# Patient Record
Sex: MALE | Race: WHITE | Employment: OTHER | URBAN - METROPOLITAN AREA
[De-identification: names, ages, dates, MRNs, and addresses within clinical notes are randomized per-mention and may not be internally consistent; named-entity substitution may affect disease eponyms.]

---

## 2017-01-19 ENCOUNTER — ED HISTORICAL/CONVERTED ENCOUNTER (OUTPATIENT)
Dept: OTHER | Age: 42
End: 2017-01-19

## 2017-05-04 ENCOUNTER — OP HISTORICAL/CONVERTED ENCOUNTER (OUTPATIENT)
Dept: OTHER | Age: 42
End: 2017-05-04

## 2017-05-22 ENCOUNTER — ED HISTORICAL/CONVERTED ENCOUNTER (OUTPATIENT)
Dept: OTHER | Age: 42
End: 2017-05-22

## 2017-07-20 ENCOUNTER — ED HISTORICAL/CONVERTED ENCOUNTER (OUTPATIENT)
Dept: OTHER | Age: 42
End: 2017-07-20

## 2020-08-04 ENCOUNTER — OFFICE VISIT (OUTPATIENT)
Dept: ORTHOPEDIC SURGERY | Age: 45
End: 2020-08-04
Payer: MEDICARE

## 2020-08-04 VITALS — HEIGHT: 60 IN | WEIGHT: 230 LBS | BODY MASS INDEX: 45.16 KG/M2

## 2020-08-04 DIAGNOSIS — M25.441 SWELLING OF JOINT OF RIGHT HAND: Primary | ICD-10-CM

## 2020-08-04 PROCEDURE — 99203 OFFICE O/P NEW LOW 30 MIN: CPT | Performed by: ORTHOPAEDIC SURGERY

## 2020-08-04 PROCEDURE — G8427 DOCREV CUR MEDS BY ELIG CLIN: HCPCS | Performed by: ORTHOPAEDIC SURGERY

## 2020-08-04 NOTE — PROGRESS NOTES
Name: Elvia Rivera    : 1975  Service Dept: [address] [place] [address] AND SPORTS MEDICINE         Patient presents with:  Hand Pain: right       Patient's Pharmacies:    [facility], [facility] [address]  [address] [address]  Phone: [phone] Fax: [phone] [address], [address]  [address] [place] [address]  Phone: [phone] Fax: [phone]        3' 11\" (1.194 m)   Wt 230 lb (104.3 kg)   BMI 73.20 kg/m²       -- Penicillins -- Other (comments)    --  Throat closes     Current Outpatient Medications:  atorvastatin (LIPITOR) 40 mg tablet, Take 1 Tab by mouth daily. For lipids. Please make an appointment foradditional refills. , Disp: 30 Tab, Rfl: 0  QUEtiapine SR (SEROQUEL XR) 300 mg sr tablet, Take 300 mg by mouth nightly., Disp: , Rfl:   traZODone (DESYREL) 100 mg tablet, Take 200 mg by mouth nightly., Disp: , Rfl:   haloperidol (HALDOL) 5 mg tablet, Take 5 mg by mouth two (2) times a day., Disp: , Rfl:   clonazePAM (KLONOPIN) 1 mg tablet, Take  by mouth two (2) times a day., Disp: , Rfl:   divalproex DR (DEPAKOTE) 500 mg tablet, Take 500 mg by mouth two (2) times a day., Disp: , Rfl:   divalproex DR (DEPAKOTE) 250 mg tablet, Take 250 mg by mouth daily. , Disp: , Rfl:   gabapentin (NEURONTIN) 800 mg tablet, Take  by mouth three (3) times daily. , Disp: , Rfl:   traMADol (ULTRAM) 50 mg tablet, Take 50 mg by mouth every eight (8) hours as needed for Pain., Disp: , Rfl:   oxyCODONE-acetaminophen (PERCOCET 7.5) 7.5-325 mg per tablet, Take 1 Tab by mouth two (2) times daily as needed for Pain., Disp: , Rfl:   Dextromethorphan-guaiFENesin (MUCINEX DM) 60-1,200 mg TM12, Take 1 Tab by mouth two (2) times daily (after meals). , Disp: 20 Tab, Rfl: 0         Patient Active Problem List:     Obese [E66.9]     Well adult Nadean Fraction     Smoker unmotivated to quit [F17.200]     Chronic back pain greater than 3 months duration [M54.9, G89.29]     Previous back surgery [Z98.890]     Hyperlipidemia, mixed [E78.2]       Review of patient's family history indicates:  Problem: Diabetes      Relation: Mother          Age of Onset: (Not Specified)  Problem: Hypertension      Relation: Mother          Age of Onset: (Not Specified)  Problem: Heart Disease      Relation: Mother          Age of Onset: (Not Specified)  Problem: Cancer      Relation: Maternal Aunt          Age of Onset: (Not Specified)  Problem: Diabetes      Relation: Maternal Aunt          Age of Onset: (Not Specified)  Problem: Heart Disease      Relation: Maternal Aunt          Age of Onset: (Not Specified)  Problem: Hypertension      Relation: Maternal Aunt          Age of Onset: (Not Specified)       Social History    Socioeconomic History      Marital status: LEGALLY       Spouse name: Not on file      Number of children: Not on file      Years of education: Not on file      Highest education level: Not on file    Tobacco Use      Smoking status: Current Some Day Smoker        Packs/day: 0.00      Smokeless tobacco: Never Used    Substance and Sexual Activity      Alcohol use: Yes        Alcohol/week: 1.0 standard drinks        Types: 1 Cans of beer per week      Drug use: No      Sexual activity: Yes        Partners: Female        Birth control/protection: Condom       Past Surgical History:  No date: HX BACK SURGERY      Comment:  3 failed     Past Medical History:  No date:  Anxiety  No date: Arthritis  No date: Blurred vision  No date: Constipation  No date: Coughing  No date: Diarrhea  No date: High cholesterol  No date: History of back surgery      Comment:  3 failed surgeries  No date: Joint pain      Comment:  all over  No date: Joint swelling  No date: Multiple stiff joints  No date: Muscle ache  No date: Muscle pain  No date: Muscle weakness  No date: Sinus complaint  No date: Sleeping difficulty  No date: SOB (shortness of breath)  No date: Stress     HPI:   I have reviewed and agree with PFSH and ROS and intake form in chart and the record. Review of Systems:   Patient is a pleasant appearing individual, appropriately dressed, well hydrated, well nourished, who is alert, appropriately oriented for age, and in no acute distress with a normal gait and normal affect who does not appear to be in any significant pain. Physical Exam:  Right 3rd Finger is grossly neurovascularly intact with good cap refill, slight decreased range of motion with slightly decreased strength, able to flex and extend against resistance, mild swelling, no instability and no other skin lesions noted. HPI:  The patient is here with a chief complaint of right hand and finger pain, throbbing, burning pain. It is the same. Nothing has helped. Using it makes it worse. Pain is 7/10. ROS:  Positive for nighttime pain, locking, and night sweats. X-rays of the right hand are unremarkable. Assessment/Plan:  1. Right middle finger sprain. Plan at this point, Voltaren Gel, physical therapy, that needs to be done at [facility].  We will see him back in eight weeks. If he is not better, we will consider an MRI of his right hand and go from there but continue with conservative treatment in the meantime. Scribed by Spring Minna as dictated by [facility]NA San MD.    This function is not supported for plain text fields. Return to Office: Follow-up Information    None           Documentation True and Accepted Jordan San MD

## 2020-08-04 NOTE — PROGRESS NOTES
Providers protocol for the intake nurse to complete in patient's chart:    J Luis Siegel presents today for   Chief Complaint   Patient presents with    Hand Pain     right       Pain assessment:entered from intake paperwork  Height entered from intake paperwork    Weight entered from intake paperwork      Tempeture is taken by AYSHA CASTRO  Travel Screening done by AYSHA CASTRO    Provider will complete the rest of patient's chart

## 2020-08-05 RX ORDER — DICLOFENAC SODIUM 10 MG/G
2 GEL TOPICAL 4 TIMES DAILY
Qty: 100 G | Refills: 5 | Status: SHIPPED | OUTPATIENT
Start: 2020-08-05 | End: 2020-08-05

## 2020-08-05 RX ORDER — DICLOFENAC SODIUM 10 MG/G
2 GEL TOPICAL 4 TIMES DAILY
Qty: 100 G | Refills: 5 | Status: SHIPPED | OUTPATIENT
Start: 2020-08-05

## 2020-08-05 NOTE — PATIENT INSTRUCTIONS

## 2021-08-14 ENCOUNTER — APPOINTMENT (OUTPATIENT)
Dept: GENERAL RADIOLOGY | Age: 46
End: 2021-08-14
Attending: NURSE PRACTITIONER
Payer: MEDICARE

## 2021-08-14 ENCOUNTER — HOSPITAL ENCOUNTER (EMERGENCY)
Age: 46
Discharge: HOME | End: 2021-08-15
Payer: MEDICARE

## 2021-08-14 VITALS
RESPIRATION RATE: 20 BRPM | WEIGHT: 225 LBS | BODY MASS INDEX: 32.21 KG/M2 | HEIGHT: 70 IN | SYSTOLIC BLOOD PRESSURE: 114 MMHG | TEMPERATURE: 97.9 F | DIASTOLIC BLOOD PRESSURE: 75 MMHG | OXYGEN SATURATION: 95 % | HEART RATE: 108 BPM

## 2021-08-14 DIAGNOSIS — M51.36 DDD (DEGENERATIVE DISC DISEASE), LUMBAR: Primary | ICD-10-CM

## 2021-08-14 LAB
AMPHET UR QL SCN: NEGATIVE
APPEARANCE UR: CLEAR
BACTERIA URNS QL MICRO: NEGATIVE /HPF
BARBITURATES UR QL SCN: NEGATIVE
BENZODIAZ UR QL: NEGATIVE
BILIRUB UR QL: NEGATIVE
CANNABINOIDS UR QL SCN: NEGATIVE
COCAINE UR QL SCN: NEGATIVE
COLOR UR: ABNORMAL
DRUG SCRN COMMENT,DRGCM: NORMAL
GLUCOSE UR STRIP.AUTO-MCNC: NEGATIVE MG/DL
HGB UR QL STRIP: NEGATIVE
KETONES UR QL STRIP.AUTO: NEGATIVE MG/DL
LEUKOCYTE ESTERASE UR QL STRIP.AUTO: NEGATIVE
METHADONE UR QL: NEGATIVE
NITRITE UR QL STRIP.AUTO: NEGATIVE
OPIATES UR QL: NEGATIVE
PCP UR QL: NEGATIVE
PH UR STRIP: 5 [PH] (ref 5–8)
PROT UR STRIP-MCNC: NEGATIVE MG/DL
RBC #/AREA URNS HPF: ABNORMAL /HPF (ref 0–5)
SP GR UR REFRACTOMETRY: 1 (ref 1–1.03)
UA: UC IF INDICATED,UAUC: ABNORMAL
UROBILINOGEN UR QL STRIP.AUTO: 0.1 EU/DL (ref 0.1–1)
WBC URNS QL MICRO: ABNORMAL /HPF (ref 0–4)

## 2021-08-14 PROCEDURE — 80307 DRUG TEST PRSMV CHEM ANLYZR: CPT

## 2021-08-14 PROCEDURE — 96372 THER/PROPH/DIAG INJ SC/IM: CPT

## 2021-08-14 PROCEDURE — 74011250636 HC RX REV CODE- 250/636: Performed by: NURSE PRACTITIONER

## 2021-08-14 PROCEDURE — 99282 EMERGENCY DEPT VISIT SF MDM: CPT

## 2021-08-14 PROCEDURE — 81001 URINALYSIS AUTO W/SCOPE: CPT

## 2021-08-14 PROCEDURE — 72110 X-RAY EXAM L-2 SPINE 4/>VWS: CPT

## 2021-08-14 PROCEDURE — 87086 URINE CULTURE/COLONY COUNT: CPT

## 2021-08-14 RX ORDER — IBUPROFEN 600 MG/1
600 TABLET ORAL
Qty: 20 TABLET | Refills: 0 | Status: SHIPPED | OUTPATIENT
Start: 2021-08-14

## 2021-08-14 RX ORDER — KETOROLAC TROMETHAMINE 30 MG/ML
60 INJECTION, SOLUTION INTRAMUSCULAR; INTRAVENOUS
Status: COMPLETED | OUTPATIENT
Start: 2021-08-14 | End: 2021-08-14

## 2021-08-14 RX ADMIN — KETOROLAC TROMETHAMINE 60 MG: 30 INJECTION, SOLUTION INTRAMUSCULAR; INTRAVENOUS at 21:44

## 2021-08-15 NOTE — ED PROVIDER NOTES
EMERGENCY DEPARTMENT HISTORY AND PHYSICAL EXAM      Date: 8/14/2021  Patient Name: Fallon Hobson    History of Presenting Illness     Chief Complaint   Patient presents with   Chin Tristen Fall    Back Pain       History Provided By: Patient    HPI: Fallon Hobson, 50 y.o. male with a past medical history as noted below presents ambulatory to the ED with cc of back pain. Patient reports chronic back pain with multiple surgeries, followed by Dr. Teo Santos and pain management. Patient states he fell off 5 foot high deck 2 days ago. Since that time he reports worsening pain and states he has been unable to walk. He denies any saddle numbness or changes in bowel/bladder habits. He describes his pain as severe sharp in nature, 10/10 presently. He states he has been using heating pad and Tylenol with minimal improvement, aggravating factors include movement, alleviating factors none. There are no other complaints, changes, or physical findings at this time. PCP: Dago Sims MD    No current facility-administered medications on file prior to encounter. Current Outpatient Medications on File Prior to Encounter   Medication Sig Dispense Refill    diclofenac (VOLTAREN) 1 % gel Apply 2 g to affected area four (4) times daily. 100 g 5    atorvastatin (LIPITOR) 40 mg tablet Take 1 Tab by mouth daily. For lipids. Please make an appointment foradditional refills. 30 Tab 0    QUEtiapine SR (SEROQUEL XR) 300 mg sr tablet Take 300 mg by mouth nightly.  traZODone (DESYREL) 100 mg tablet Take 200 mg by mouth nightly.  haloperidol (HALDOL) 5 mg tablet Take 5 mg by mouth two (2) times a day.  clonazePAM (KLONOPIN) 1 mg tablet Take  by mouth two (2) times a day.  divalproex DR (DEPAKOTE) 500 mg tablet Take 500 mg by mouth two (2) times a day.  divalproex DR (DEPAKOTE) 250 mg tablet Take 250 mg by mouth daily.  gabapentin (NEURONTIN) 800 mg tablet Take  by mouth three (3) times daily.       traMADol (ULTRAM) 50 mg tablet Take 50 mg by mouth every eight (8) hours as needed for Pain.  oxyCODONE-acetaminophen (PERCOCET 7.5) 7.5-325 mg per tablet Take 1 Tab by mouth two (2) times daily as needed for Pain.  Dextromethorphan-guaiFENesin (MUCINEX DM) 60-1,200 mg TM12 Take 1 Tab by mouth two (2) times daily (after meals). 20 Tab 0       Past History     Past Medical History:  Past Medical History:   Diagnosis Date    Anxiety     Anxiety     Arthritis     Bipolar 2 disorder (HCC)     Blurred vision     Constipation     Coughing     Diarrhea     High cholesterol     History of back surgery     3 failed surgeries    Joint pain     all over    Joint swelling     Manic depression (HCC)     Multiple stiff joints     Muscle ache     Muscle pain     Muscle weakness     Schizophrenia (HCC)     Sinus complaint     Sleeping difficulty     SOB (shortness of breath)     Stress        Past Surgical History:  Past Surgical History:   Procedure Laterality Date    HX BACK SURGERY      3 failed       Family History:  Family History   Problem Relation Age of Onset    Diabetes Mother     Hypertension Mother     Heart Disease Mother     Cancer Maternal Aunt     Diabetes Maternal Aunt     Heart Disease Maternal Aunt     Hypertension Maternal Aunt        Social History:  Social History     Tobacco Use    Smoking status: Current Every Day Smoker     Packs/day: 0.00    Smokeless tobacco: Never Used   Substance Use Topics    Alcohol use: Not Currently     Alcohol/week: 1.0 standard drinks     Types: 1 Cans of beer per week    Drug use: No       Allergies: Allergies   Allergen Reactions    Penicillins Other (comments)     Throat closes         Review of Systems     Review of Systems   Genitourinary: Negative. Musculoskeletal: Positive for back pain and gait problem. Neurological: Negative for numbness. All other systems reviewed and are negative.       Physical Exam     Physical Exam  Vitals and nursing note reviewed. Constitutional:       General: He is not in acute distress. Appearance: Normal appearance. Eyes:      Extraocular Movements: Extraocular movements intact. Conjunctiva/sclera: Conjunctivae normal.   Cardiovascular:      Rate and Rhythm: Normal rate and regular rhythm. Heart sounds: Normal heart sounds. Pulmonary:      Effort: Pulmonary effort is normal.      Breath sounds: Normal breath sounds. No wheezing or rales. Musculoskeletal:      Lumbar back: Signs of trauma and tenderness present. No deformity. Decreased range of motion. Comments: No midline tenderness, no step off. +right paraspinal tenderness. +SI joint pain with producible pain. Sensation intact distally. Strength 5/5 in BL lower extremities     Skin:     General: Skin is warm and dry. Neurological:      General: No focal deficit present. Mental Status: He is alert.       Comments: Ambulating with cane, minimal difficulty         Lab and Diagnostic Study Results     Labs -     Recent Results (from the past 12 hour(s))   URINALYSIS W/ REFLEX CULTURE    Collection Time: 08/14/21  8:10 PM    Specimen: Urine   Result Value Ref Range    Color Yellow/Straw      Appearance Clear Clear      Specific gravity 1.005 1.003 - 1.030      pH (UA) 5.0 5.0 - 8.0      Protein Negative Negative mg/dL    Glucose Negative Negative mg/dL    Ketone Negative Negative mg/dL    Bilirubin Negative Negative      Blood Negative Negative      Urobilinogen 0.1 0.1 - 1.0 EU/dL    Nitrites Negative Negative      Leukocyte Esterase Negative Negative      UA:UC IF INDICATED Urine Culture Ordered (A) Culture not indicated by UA result      WBC 0-5 0 - 4 /hpf    RBC 0-5 0 - 5 /hpf    Bacteria Negative Negative /hpf   DRUG SCREEN, URINE    Collection Time: 08/14/21  8:10 PM   Result Value Ref Range    AMPHETAMINES Negative Negative      BARBITURATES Negative Negative      BENZODIAZEPINES Negative Negative      COCAINE Negative Negative      METHADONE Negative Negative      OPIATES Negative Negative      PCP(PHENCYCLIDINE) Negative Negative      THC (TH-CANNABINOL) Negative Negative      Drug screen comment        This test is a screen for drugs of abuse in a medical setting only (i.e., they are unconfirmed results and as such must not be used for non-medical purposes, e.g.,employment testing, legal testing). Due to its inherent nature, false positive (FP) and false negative (FN) results may be obtained. Therefore, if necessary for medical care, recommend confirmation of positive findings by GC/MS. Radiologic Studies -   XR Results (most recent):  Results from Hospital Encounter encounter on 08/14/21    XR SPINE LUMB MIN 4 V    Narrative  Lumbar spine, 5 views    Impression  Degenerative disc disease with disc space narrowing and osteophyte  at L4-L5. Decompressive laminectomy at L4 and L5. Normal spinal alignment. No  evidence of acute fracture or focal lytic or blastic bone lesion. Degenerative  disc disease with disc space narrowing and small anterior osteophyte, T11-T12. Medical Decision Making   - I am the first provider for this patient. - I reviewed the vital signs, available nursing notes, past medical history, past surgical history, family history and social history. - Initial assessment performed. The patients presenting problems have been discussed, and they are in agreement with the care plan formulated and outlined with them. I have encouraged them to ask questions as they arise throughout their visit. Vital Signs-Reviewed the patient's vital signs.   Patient Vitals for the past 12 hrs:   Temp Pulse Resp BP SpO2   08/14/21 1942 97.9 °F (36.6 °C) (!) 108 20 114/75 95 %       Records Reviewed: Nursing Notes and Old Medical Records    The patient presents with back pain with a differential diagnosis of  kidney stone, lumbar strain, pneumonia and traumatic injury      ED Course:   Patient presents with worsening back pain after a fall 2 days ago. Neurovascularly intact. He is ambulatory with minimal difficulty upon arrival.  X-ray showing diffuse DDD, no acute findings. He was treated with IM Toradol. Patient advised to follow-up with orthopedic and pain management as previous, states he has a pending Ortho eval in 2 days. Discussed worrisome reasons to return to the department including worsening symptoms, decrease in sensation or changes in bowel or bladder habits. Provider Notes (Medical Decision Making):     MDM  Number of Diagnoses or Management Options  DDD (degenerative disc disease), lumbar: established, worsening     Amount and/or Complexity of Data Reviewed  Tests in the radiology section of CPT®: ordered and reviewed             Disposition   Disposition: Condition stable  DC-The patient was given verbal follow-up instructions  DC- Pain Control DC Home plan: Nonsteroidals, Tylenol and Referral Orthopedics    Discharged    DISCHARGE PLAN:  1. Current Discharge Medication List      START taking these medications    Details   ibuprofen (MOTRIN) 600 mg tablet Take 1 Tablet by mouth every six (6) hours as needed for Pain. Qty: 20 Tablet, Refills: 0         CONTINUE these medications which have NOT CHANGED    Details   diclofenac (VOLTAREN) 1 % gel Apply 2 g to affected area four (4) times daily. Qty: 100 g, Refills: 5      atorvastatin (LIPITOR) 40 mg tablet Take 1 Tab by mouth daily. For lipids. Please make an appointment foradditional refills. Qty: 30 Tab, Refills: 0    Associated Diagnoses: Hyperlipidemia, mixed      QUEtiapine SR (SEROQUEL XR) 300 mg sr tablet Take 300 mg by mouth nightly. traZODone (DESYREL) 100 mg tablet Take 200 mg by mouth nightly.      haloperidol (HALDOL) 5 mg tablet Take 5 mg by mouth two (2) times a day. clonazePAM (KLONOPIN) 1 mg tablet Take  by mouth two (2) times a day.       !! divalproex DR (DEPAKOTE) 500 mg tablet Take 500 mg by mouth two (2) times a day. !! divalproex DR (DEPAKOTE) 250 mg tablet Take 250 mg by mouth daily. gabapentin (NEURONTIN) 800 mg tablet Take  by mouth three (3) times daily. traMADol (ULTRAM) 50 mg tablet Take 50 mg by mouth every eight (8) hours as needed for Pain. oxyCODONE-acetaminophen (PERCOCET 7.5) 7.5-325 mg per tablet Take 1 Tab by mouth two (2) times daily as needed for Pain. Dextromethorphan-guaiFENesin (MUCINEX DM) 60-1,200 mg TM12 Take 1 Tab by mouth two (2) times daily (after meals). Qty: 20 Tab, Refills: 0    Associated Diagnoses: Viral upper respiratory tract infection with cough       !! - Potential duplicate medications found. Please discuss with provider. 2.   Follow-up Information     Follow up With Specialties Details Why Contact Info    Thornton Baumgarten, MD Orthopedic Surgery Go to  as scheduled [address] [facility]  [address]      Stephanie Higgins MD Family Medicine Schedule an appointment as soon as possible for a visit  for ER follow up [address] [place] [address]  [phone]          3. Return to ED if worse   4. Current Discharge Medication List      START taking these medications    Details   ibuprofen (MOTRIN) 600 mg tablet Take 1 Tablet by mouth every six (6) hours as needed for Pain. Qty: 20 Tablet, Refills: 0  Start date: 8/14/2021               Diagnosis     Clinical Impression:   1. DDD (degenerative disc disease), lumbar        Attestations:    Nima Ray NP    Please note that this dictation was completed with [employer], the [employer] voice recognition software. Quite often unanticipated grammatical, syntax, homophones, and other interpretive errors are inadvertently transcribed by the computer software. Please disregard these errors. Please excuse any errors that have escaped final proofreading. Thank you.

## 2021-08-15 NOTE — DISCHARGE INSTRUCTIONS
Thank you! Thank you for allowing me to care for you in the emergency department. I sincerely hope that you are satisfied with your visit today. It is my goal to provide you with excellent care. Below you will find a list of your labs and imaging from your visit today. Should you have any questions regarding these results please do not hesitate to call the emergency department. Labs -     Recent Results (from the past 12 hour(s))   URINALYSIS W/ REFLEX CULTURE    Collection Time: 08/14/21  8:10 PM    Specimen: Urine   Result Value Ref Range    Color Yellow/Straw      Appearance Clear Clear      Specific gravity 1.005 1.003 - 1.030      pH (UA) 5.0 5.0 - 8.0      Protein Negative Negative mg/dL    Glucose Negative Negative mg/dL    Ketone Negative Negative mg/dL    Bilirubin Negative Negative      Blood Negative Negative      Urobilinogen 0.1 0.1 - 1.0 EU/dL    Nitrites Negative Negative      Leukocyte Esterase Negative Negative      UA:UC IF INDICATED Urine Culture Ordered (A) Culture not indicated by UA result      WBC 0-5 0 - 4 /hpf    RBC 0-5 0 - 5 /hpf    Bacteria Negative Negative /hpf   DRUG SCREEN, URINE    Collection Time: 08/14/21  8:10 PM   Result Value Ref Range    AMPHETAMINES Negative Negative      BARBITURATES Negative Negative      BENZODIAZEPINES Negative Negative      COCAINE Negative Negative      METHADONE Negative Negative      OPIATES Negative Negative      PCP(PHENCYCLIDINE) Negative Negative      THC (TH-CANNABINOL) Negative Negative      Drug screen comment        This test is a screen for drugs of abuse in a medical setting only (i.e., they are unconfirmed results and as such must not be used for non-medical purposes, e.g.,employment testing, legal testing). Due to its inherent nature, false positive (FP) and false negative (FN) results may be obtained. Therefore, if necessary for medical care, recommend confirmation of positive findings by GC/MS.        Radiologic Studies -   XR SPINE LUMB MIN 4 V   Final Result   Degenerative disc disease with disc space narrowing and osteophyte   at L4-L5. Decompressive laminectomy at L4 and L5. Normal spinal alignment. No   evidence of acute fracture or focal lytic or blastic bone lesion. Degenerative   disc disease with disc space narrowing and small anterior osteophyte, T11-T12. CT Results  (Last 48 hours)      None          CXR Results  (Last 48 hours)      None               If you feel that you have not received excellent quality care or timely care, please ask to speak to the nurse manager. Please choose us in the future for your continued health care needs. ------------------------------------------------------------------------------------------------------------  The exam and treatment you received in the Emergency Department were for an urgent problem and are not intended as complete care. It is important that you follow-up with a doctor, nurse practitioner, or physician assistant to:  (1) confirm your diagnosis,  (2) re-evaluation of changes in your illness and treatment, and  (3) for ongoing care. If your symptoms become worse or you do not improve as expected and you are unable to reach your usual health care provider, you should return to the Emergency Department. We are available 24 hours a day. Please take your discharge instructions with you when you go to your follow-up appointment. If you have any problem arranging a follow-up appointment, contact the Emergency Department immediately. If a prescription has been provided, please have it filled as soon as possible to prevent a delay in treatment. Read the entire medication instruction sheet provided to you by the pharmacy. If you have any questions or reservations about taking the medication due to side effects or interactions with other medications, please call your primary care physician or contact the ER to speak with the charge nurse.      Make an appointment with your family doctor or the physician you were referred to for follow-up of this visit as instructed on your discharge paperwork, as this is a mandatory follow-up. Return to the ER if you are unable to be seen or if you are unable to be seen in a timely manner. If you have any problem arranging the follow-up visit, contact the Emergency Department immediately.

## 2021-08-16 LAB
BACTERIA SPEC CULT: NORMAL
SPECIAL REQUESTS,SREQ: NORMAL

## 2021-08-17 ENCOUNTER — HOSPITAL ENCOUNTER (EMERGENCY)
Age: 46
Discharge: HOME | End: 2021-08-17
Payer: MEDICARE

## 2021-08-17 ENCOUNTER — APPOINTMENT (OUTPATIENT)
Dept: GENERAL RADIOLOGY | Age: 46
End: 2021-08-17
Attending: EMERGENCY MEDICINE
Payer: MEDICARE

## 2021-08-17 VITALS
RESPIRATION RATE: 18 BRPM | WEIGHT: 230 LBS | BODY MASS INDEX: 32.93 KG/M2 | DIASTOLIC BLOOD PRESSURE: 63 MMHG | TEMPERATURE: 98 F | SYSTOLIC BLOOD PRESSURE: 108 MMHG | HEART RATE: 111 BPM | OXYGEN SATURATION: 97 % | HEIGHT: 70 IN

## 2021-08-17 DIAGNOSIS — M19.072 OSTEOARTHRITIS OF LEFT ANKLE AND FOOT: ICD-10-CM

## 2021-08-17 DIAGNOSIS — M25.572 ACUTE LEFT ANKLE PAIN: ICD-10-CM

## 2021-08-17 DIAGNOSIS — M77.50 ANKLE BONE SPUR: ICD-10-CM

## 2021-08-17 DIAGNOSIS — M79.672 FOOT PAIN, LEFT: Primary | ICD-10-CM

## 2021-08-17 PROCEDURE — 73610 X-RAY EXAM OF ANKLE: CPT

## 2021-08-17 PROCEDURE — 73630 X-RAY EXAM OF FOOT: CPT

## 2021-08-17 PROCEDURE — 99283 EMERGENCY DEPT VISIT LOW MDM: CPT

## 2021-08-17 RX ORDER — HYDROCODONE BITARTRATE AND ACETAMINOPHEN 5; 325 MG/1; MG/1
1 TABLET ORAL
Qty: 10 TABLET | Refills: 0 | Status: SHIPPED | OUTPATIENT
Start: 2021-08-17 | End: 2021-08-20

## 2021-08-17 RX ORDER — DICLOFENAC SODIUM 75 MG/1
75 TABLET, DELAYED RELEASE ORAL 2 TIMES DAILY
Qty: 20 TABLET | Refills: 0 | Status: SHIPPED | OUTPATIENT
Start: 2021-08-17 | End: 2021-08-27

## 2021-08-17 NOTE — ED PROVIDER NOTES
EMERGENCY DEPARTMENT HISTORY AND PHYSICAL EXAM      Date: 8/17/2021  Patient Name: Linda Stock    History of Presenting Illness     Chief Complaint   Patient presents with    Foot Pain       History Provided By: Patient    HPI: Linda Stock, 50 y.o. male with a past medical history significant schizophrenia, manic depresion, anxiety, high cholesterol. presents to the ED with cc of glf today , twisted left ankle and left foot. Pain and swelling. Onset several hours prior to arrival     There are no other complaints, changes, or physical findings at this time. PCP: Sue Figueroa MD    No current facility-administered medications on file prior to encounter. Current Outpatient Medications on File Prior to Encounter   Medication Sig Dispense Refill    ibuprofen (MOTRIN) 600 mg tablet Take 1 Tablet by mouth every six (6) hours as needed for Pain. 20 Tablet 0    diclofenac (VOLTAREN) 1 % gel Apply 2 g to affected area four (4) times daily. 100 g 5    atorvastatin (LIPITOR) 40 mg tablet Take 1 Tab by mouth daily. For lipids. Please make an appointment foradditional refills. 30 Tab 0    QUEtiapine SR (SEROQUEL XR) 300 mg sr tablet Take 300 mg by mouth nightly.  traZODone (DESYREL) 100 mg tablet Take 200 mg by mouth nightly.  haloperidol (HALDOL) 5 mg tablet Take 5 mg by mouth two (2) times a day.  clonazePAM (KLONOPIN) 1 mg tablet Take  by mouth two (2) times a day.  divalproex DR (DEPAKOTE) 500 mg tablet Take 500 mg by mouth two (2) times a day.  divalproex DR (DEPAKOTE) 250 mg tablet Take 250 mg by mouth daily.  gabapentin (NEURONTIN) 800 mg tablet Take  by mouth three (3) times daily.  traMADol (ULTRAM) 50 mg tablet Take 50 mg by mouth every eight (8) hours as needed for Pain.  oxyCODONE-acetaminophen (PERCOCET 7.5) 7.5-325 mg per tablet Take 1 Tab by mouth two (2) times daily as needed for Pain.       Dextromethorphan-guaiFENesin (MUCINEX DM) 60-1,200 mg TM12 Take 1 Tab by mouth two (2) times daily (after meals). 20 Tab 0       Past History     Past Medical History:  Past Medical History:   Diagnosis Date    Anxiety     Anxiety     Arthritis     Bipolar 2 disorder (HCC)     Blurred vision     Constipation     Coughing     Diarrhea     High cholesterol     History of back surgery     3 failed surgeries    Joint pain     all over    Joint swelling     Manic depression (HCC)     Multiple stiff joints     Muscle ache     Muscle pain     Muscle weakness     Schizophrenia (HCC)     Sinus complaint     Sleeping difficulty     SOB (shortness of breath)     Stress        Past Surgical History:  Past Surgical History:   Procedure Laterality Date    HX BACK SURGERY      3 failed       Family History:  Family History   Problem Relation Age of Onset    Diabetes Mother     Hypertension Mother     Heart Disease Mother     Cancer Maternal Aunt     Diabetes Maternal Aunt     Heart Disease Maternal Aunt     Hypertension Maternal Aunt        Social History:  Social History     Tobacco Use    Smoking status: Current Every Day Smoker     Packs/day: 0.00    Smokeless tobacco: Never Used   Substance Use Topics    Alcohol use: Not Currently     Alcohol/week: 1.0 standard drinks     Types: 1 Cans of beer per week    Drug use: No       Allergies: Allergies   Allergen Reactions    Penicillins Other (comments)     Throat closes         Review of Systems     Review of Systems   Constitutional: Negative. HENT: Negative. Respiratory: Negative. Cardiovascular: Negative. Gastrointestinal: Negative. Genitourinary: Negative. Musculoskeletal: Positive for joint swelling (left ankle. left foot;). Neurological: Negative. All other systems reviewed and are negative. Physical Exam     Physical Exam  Vitals and nursing note reviewed. Constitutional:       Appearance: Normal appearance. He is normal weight.    HENT:      Head: Normocephalic and atraumatic. Eyes:      Extraocular Movements: Extraocular movements intact. Pupils: Pupils are equal, round, and reactive to light. Cardiovascular:      Rate and Rhythm: Normal rate and regular rhythm. Pulses: Normal pulses. Heart sounds: Normal heart sounds. Pulmonary:      Effort: Pulmonary effort is normal.      Breath sounds: Normal breath sounds. Abdominal:      General: Abdomen is flat. Musculoskeletal:         General: Swelling, tenderness and signs of injury present. No deformity. Normal range of motion. Left lower leg: Edema (left foot and left ankle. ) present. Skin:     General: Skin is warm and dry. Capillary Refill: Capillary refill takes less than 2 seconds. Neurological:      General: No focal deficit present. Mental Status: He is alert and oriented to person, place, and time. Psychiatric:         Mood and Affect: Mood normal.         Behavior: Behavior normal.         Lab and Diagnostic Study Results     Labs -   No results found for this or any previous visit (from the past 12 hour(s)). Radiologic Studies -   @lastxrresult@  CT Results  (Last 48 hours)    None        CXR Results  (Last 48 hours)    None            Medical Decision Making   - I am the first provider for this patient. - I reviewed the vital signs, available nursing notes, past medical history, past surgical history, family history and social history. - Initial assessment performed. The patients presenting problems have been discussed, and they are in agreement with the care plan formulated and outlined with them. I have encouraged them to ask questions as they arise throughout their visit. Vital Signs-Reviewed the patient's vital signs. No data found. Records Reviewed: Nursing Notes    The patient presents with ANKLE PAIN with a differential diagnosis of ANKLE SPRAIN, STRAIN, FRACTURE, DISLOCATION, CONTUSION      ED Course:      IMAGING. SPLINTING.        Provider Notes (Medical Decision Making):     MDM  Number of Diagnoses or Management Options  Acute left ankle pain: new, needed workup  Ankle bone spur: new, needed workup  Foot pain, left: new, needed workup  Osteoarthritis of left ankle and foot: new, needed workup     Amount and/or Complexity of Data Reviewed  Tests in the radiology section of CPT®: ordered and reviewed    Risk of Complications, Morbidity, and/or Mortality  Presenting problems: minimal  Diagnostic procedures: low  Management options: minimal  General comments: RICE  Left ankle splint and crutches     Patient Progress  Patient progress: improved         Procedures   Medical Decision Makingedical Decision Making  Performed by: Maria Guadalupe Pizarro NP  PROCEDURES:  Procedures       Disposition   Disposition: Condition improved  DC- Adult Discharges: All of the diagnostic tests were reviewed and questions answered. Diagnosis, care plan and treatment options were discussed. The patient understands the instructions and will follow up as directed. The patients results have been reviewed with them. They have been counseled regarding their diagnosis. The patient verbally convey understanding and agreement of the signs, symptoms, diagnosis, treatment and prognosis and additionally agrees to follow up as recommended with their PCP in 24 - 48 hours. They also agree with the care-plan and convey that all of their questions have been answered. I have also put together some discharge instructions for them that include: 1) educational information regarding their diagnosis, 2) how to care for their diagnosis at home, as well a 3) list of reasons why they would want to return to the ED prior to their follow-up appointment, should their condition change. DC-The patient was given verbal follow-up instructions  DC- Pain Control DC Home plan: Nonsteroidals and Tylenol    Discharged    DISCHARGE PLAN:  1.    Current Discharge Medication List      CONTINUE these medications which have NOT CHANGED    Details   ibuprofen (MOTRIN) 600 mg tablet Take 1 Tablet by mouth every six (6) hours as needed for Pain. Qty: 20 Tablet, Refills: 0      diclofenac (VOLTAREN) 1 % gel Apply 2 g to affected area four (4) times daily. Qty: 100 g, Refills: 5      atorvastatin (LIPITOR) 40 mg tablet Take 1 Tab by mouth daily. For lipids. Please make an appointment foradditional refills. Qty: 30 Tab, Refills: 0    Associated Diagnoses: Hyperlipidemia, mixed      QUEtiapine SR (SEROQUEL XR) 300 mg sr tablet Take 300 mg by mouth nightly. traZODone (DESYREL) 100 mg tablet Take 200 mg by mouth nightly.      haloperidol (HALDOL) 5 mg tablet Take 5 mg by mouth two (2) times a day. clonazePAM (KLONOPIN) 1 mg tablet Take  by mouth two (2) times a day. !! divalproex DR (DEPAKOTE) 500 mg tablet Take 500 mg by mouth two (2) times a day. !! divalproex DR (DEPAKOTE) 250 mg tablet Take 250 mg by mouth daily. gabapentin (NEURONTIN) 800 mg tablet Take  by mouth three (3) times daily. traMADol (ULTRAM) 50 mg tablet Take 50 mg by mouth every eight (8) hours as needed for Pain. oxyCODONE-acetaminophen (PERCOCET 7.5) 7.5-325 mg per tablet Take 1 Tab by mouth two (2) times daily as needed for Pain. Dextromethorphan-guaiFENesin (MUCINEX DM) 60-1,200 mg TM12 Take 1 Tab by mouth two (2) times daily (after meals). Qty: 20 Tab, Refills: 0    Associated Diagnoses: Viral upper respiratory tract infection with cough       !! - Potential duplicate medications found. Please discuss with provider. 2.   Follow-up Information     Follow up With Specialties Details Why Contact Info    Silviano Blair MD Orthopedic Surgery In 3 days  [facility]  [phone]          3. Return to ED if worse   4.    Discharge Medication List as of 8/17/2021  5:11 PM      START taking these medications    Details   diclofenac EC (VOLTAREN) 75 mg EC tablet Take 1 Tablet by mouth two (2) times a day for 10 days. , Normal, Disp-20 Tablet, R-0      HYDROcodone-acetaminophen (Lorcet, HYDROcodone,) 5-325 mg per tablet Take 1 Tablet by mouth every six (6) hours as needed for Pain for up to 3 days. Max Daily Amount: 4 Tablets., Normal, Disp-10 Tablet, R-0         CONTINUE these medications which have NOT CHANGED    Details   ibuprofen (MOTRIN) 600 mg tablet Take 1 Tablet by mouth every six (6) hours as needed for Pain., Normal, Disp-20 Tablet, R-0      diclofenac (VOLTAREN) 1 % gel Apply 2 g to affected area four (4) times daily. , Normal, Disp-100 g,R-5      atorvastatin (LIPITOR) 40 mg tablet Take 1 Tab by mouth daily. For lipids. Please make an appointment foradditional refills. , Normal, Disp-30 Tab,R-0      QUEtiapine SR (SEROQUEL XR) 300 mg sr tablet Take 300 mg by mouth nightly., Historical Med      traZODone (DESYREL) 100 mg tablet Take 200 mg by mouth nightly., Historical Med      haloperidol (HALDOL) 5 mg tablet Take 5 mg by mouth two (2) times a day., Historical Med      clonazePAM (KLONOPIN) 1 mg tablet Take  by mouth two (2) times a day., Historical Med      !! divalproex DR (DEPAKOTE) 500 mg tablet Take 500 mg by mouth two (2) times a day., Historical Med      !! divalproex DR (DEPAKOTE) 250 mg tablet Take 250 mg by mouth daily. , Historical Med      gabapentin (NEURONTIN) 800 mg tablet Take  by mouth three (3) times daily. , Historical Med      traMADol (ULTRAM) 50 mg tablet Take 50 mg by mouth every eight (8) hours as needed for Pain., Historical Med      oxyCODONE-acetaminophen (PERCOCET 7.5) 7.5-325 mg per tablet Take 1 Tab by mouth two (2) times daily as needed for Pain., Historical Med      Dextromethorphan-guaiFENesin (MUCINEX DM) 60-1,200 mg TM12 Take 1 Tab by mouth two (2) times daily (after meals). , Normal, Disp-20 Tab, R-0       !! - Potential duplicate medications found. Please discuss with provider. Diagnosis     Clinical Impression:   1.  Foot pain, left    2. Acute left ankle pain    3. Osteoarthritis of left ankle and foot    4. Ankle bone spur        Attestations:    Lawrence Dumont NP    Please note that this dictation was completed with Connotate, the computer voice recognition software. Quite often unanticipated grammatical, syntax, homophones, and other interpretive errors are inadvertently transcribed by the computer software. Please disregard these errors. Please excuse any errors that have escaped final proofreading. Thank you.

## 2022-02-04 ENCOUNTER — HOSPITAL ENCOUNTER (EMERGENCY)
Age: 47
Discharge: HOME | End: 2022-02-04
Attending: STUDENT IN AN ORGANIZED HEALTH CARE EDUCATION/TRAINING PROGRAM
Payer: MEDICARE

## 2022-02-04 ENCOUNTER — APPOINTMENT (OUTPATIENT)
Dept: CT IMAGING | Age: 47
End: 2022-02-04
Attending: STUDENT IN AN ORGANIZED HEALTH CARE EDUCATION/TRAINING PROGRAM
Payer: MEDICARE

## 2022-02-04 VITALS
SYSTOLIC BLOOD PRESSURE: 156 MMHG | HEIGHT: 70 IN | RESPIRATION RATE: 19 BRPM | HEART RATE: 89 BPM | BODY MASS INDEX: 32.93 KG/M2 | DIASTOLIC BLOOD PRESSURE: 95 MMHG | TEMPERATURE: 99 F | OXYGEN SATURATION: 97 % | WEIGHT: 230 LBS

## 2022-02-04 DIAGNOSIS — R03.0 ELEVATED BLOOD PRESSURE READING: ICD-10-CM

## 2022-02-04 DIAGNOSIS — Z86.59 HISTORY OF SUICIDAL IDEATION: ICD-10-CM

## 2022-02-04 DIAGNOSIS — K59.03 DRUG-INDUCED CONSTIPATION: Primary | ICD-10-CM

## 2022-02-04 LAB
ALBUMIN SERPL-MCNC: 3.6 G/DL (ref 3.5–5)
ALBUMIN/GLOB SERPL: 1 {RATIO} (ref 1.1–2.2)
ALP SERPL-CCNC: 90 U/L (ref 45–117)
ALT SERPL-CCNC: 22 U/L (ref 12–78)
ANION GAP SERPL CALC-SCNC: 7 MMOL/L (ref 5–15)
AST SERPL W P-5'-P-CCNC: 14 U/L (ref 15–37)
BASOPHILS # BLD: 0 K/UL (ref 0–0.1)
BASOPHILS NFR BLD: 0 % (ref 0–1)
BILIRUB SERPL-MCNC: 0.3 MG/DL (ref 0.2–1)
BUN SERPL-MCNC: 10 MG/DL (ref 6–20)
BUN/CREAT SERPL: 9 (ref 12–20)
CA-I BLD-MCNC: 8.4 MG/DL (ref 8.5–10.1)
CHLORIDE SERPL-SCNC: 107 MMOL/L (ref 97–108)
CO2 SERPL-SCNC: 25 MMOL/L (ref 21–32)
CREAT SERPL-MCNC: 1.12 MG/DL (ref 0.7–1.3)
DIFFERENTIAL METHOD BLD: ABNORMAL
EOSINOPHIL # BLD: 0 K/UL (ref 0–0.4)
EOSINOPHIL NFR BLD: 0 % (ref 0–7)
ERYTHROCYTE [DISTWIDTH] IN BLOOD BY AUTOMATED COUNT: 13.6 % (ref 11.5–14.5)
GLOBULIN SER CALC-MCNC: 3.6 G/DL (ref 2–4)
GLUCOSE SERPL-MCNC: 168 MG/DL (ref 65–100)
HCT VFR BLD AUTO: 41 % (ref 36.6–50.3)
HGB BLD-MCNC: 13.9 G/DL (ref 12.1–17)
IMM GRANULOCYTES # BLD AUTO: 0 K/UL (ref 0–0.04)
IMM GRANULOCYTES NFR BLD AUTO: 0 % (ref 0–0.5)
LYMPHOCYTES # BLD: 1.3 K/UL (ref 0.8–3.5)
LYMPHOCYTES NFR BLD: 10 % (ref 12–49)
MCH RBC QN AUTO: 33 PG (ref 26–34)
MCHC RBC AUTO-ENTMCNC: 33.9 G/DL (ref 30–36.5)
MCV RBC AUTO: 97.4 FL (ref 80–99)
MONOCYTES # BLD: 0.8 K/UL (ref 0–1)
MONOCYTES NFR BLD: 6 % (ref 5–13)
NEUTS SEG # BLD: 10.3 K/UL (ref 1.8–8)
NEUTS SEG NFR BLD: 84 % (ref 32–75)
NRBC # BLD: 0 K/UL (ref 0–0.01)
NRBC BLD-RTO: 0 PER 100 WBC
PLATELET # BLD AUTO: 215 K/UL (ref 150–400)
PMV BLD AUTO: 9.7 FL (ref 8.9–12.9)
POTASSIUM SERPL-SCNC: 3.5 MMOL/L (ref 3.5–5.1)
PROT SERPL-MCNC: 7.2 G/DL (ref 6.4–8.2)
RBC # BLD AUTO: 4.21 M/UL (ref 4.1–5.7)
SODIUM SERPL-SCNC: 139 MMOL/L (ref 136–145)
WBC # BLD AUTO: 12.4 K/UL (ref 4.1–11.1)

## 2022-02-04 PROCEDURE — 85025 COMPLETE CBC W/AUTO DIFF WBC: CPT

## 2022-02-04 PROCEDURE — 80053 COMPREHEN METABOLIC PANEL: CPT

## 2022-02-04 PROCEDURE — 99285 EMERGENCY DEPT VISIT HI MDM: CPT

## 2022-02-04 PROCEDURE — 96374 THER/PROPH/DIAG INJ IV PUSH: CPT

## 2022-02-04 PROCEDURE — 74011250636 HC RX REV CODE- 250/636: Performed by: STUDENT IN AN ORGANIZED HEALTH CARE EDUCATION/TRAINING PROGRAM

## 2022-02-04 PROCEDURE — 74177 CT ABD & PELVIS W/CONTRAST: CPT

## 2022-02-04 PROCEDURE — 74011000636 HC RX REV CODE- 636: Performed by: STUDENT IN AN ORGANIZED HEALTH CARE EDUCATION/TRAINING PROGRAM

## 2022-02-04 PROCEDURE — 36415 COLL VENOUS BLD VENIPUNCTURE: CPT

## 2022-02-04 PROCEDURE — 74011000250 HC RX REV CODE- 250: Performed by: STUDENT IN AN ORGANIZED HEALTH CARE EDUCATION/TRAINING PROGRAM

## 2022-02-04 RX ADMIN — FAMOTIDINE 20 MG: 10 INJECTION INTRAVENOUS at 04:39

## 2022-02-04 RX ADMIN — IOPAMIDOL 100 ML: 755 INJECTION, SOLUTION INTRAVENOUS at 05:54

## 2022-02-04 NOTE — ED NOTES
Pt belongings collected and pt changed into green gown, RHONA MORRISON made aware of pt's numerous home medications in bookbag

## 2022-02-04 NOTE — DISCHARGE INSTRUCTIONS
Thank you! Thank you for allowing me to care for you in the emergency department. I sincerely hope that you are satisfied with your visit today. It is my goal to provide you with excellent care. Below you will find a list of your labs and imaging from your visit today. Should you have any questions regarding these results please do not hesitate to call the emergency department. Labs -     Recent Results (from the past 12 hour(s))   CBC WITH AUTOMATED DIFF    Collection Time: 02/04/22  3:46 AM   Result Value Ref Range    WBC 12.4 (H) 4.1 - 11.1 K/uL    RBC 4.21 4.10 - 5.70 M/uL    HGB 13.9 12.1 - 17.0 g/dL    HCT 41.0 36.6 - 50.3 %    MCV 97.4 80.0 - 99.0 FL    MCH 33.0 26.0 - 34.0 PG    MCHC 33.9 30.0 - 36.5 g/dL    RDW 13.6 11.5 - 14.5 %    PLATELET 180 161 - 525 K/uL    MPV 9.7 8.9 - 12.9 FL    NRBC 0.0 0.0  WBC    ABSOLUTE NRBC 0.00 0.00 - 0.01 K/uL    NEUTROPHILS 84 (H) 32 - 75 %    LYMPHOCYTES 10 (L) 12 - 49 %    MONOCYTES 6 5 - 13 %    EOSINOPHILS 0 0 - 7 %    BASOPHILS 0 0 - 1 %    IMMATURE GRANULOCYTES 0 0 - 0.5 %    ABS. NEUTROPHILS 10.3 (H) 1.8 - 8.0 K/UL    ABS. LYMPHOCYTES 1.3 0.8 - 3.5 K/UL    ABS. MONOCYTES 0.8 0.0 - 1.0 K/UL    ABS. EOSINOPHILS 0.0 0.0 - 0.4 K/UL    ABS. BASOPHILS 0.0 0.0 - 0.1 K/UL    ABS. IMM. GRANS. 0.0 0.00 - 0.04 K/UL    DF AUTOMATED     METABOLIC PANEL, COMPREHENSIVE    Collection Time: 02/04/22  3:46 AM   Result Value Ref Range    Sodium 139 136 - 145 mmol/L    Potassium 3.5 3.5 - 5.1 mmol/L    Chloride 107 97 - 108 mmol/L    CO2 25 21 - 32 mmol/L    Anion gap 7 5 - 15 mmol/L    Glucose 168 (H) 65 - 100 mg/dL    BUN 10 6 - 20 mg/dL    Creatinine 1.12 0.70 - 1.30 mg/dL    BUN/Creatinine ratio 9 (L) 12 - 20      GFR est AA >60 >60 ml/min/1.73m2    GFR est non-AA >60 >60 ml/min/1.73m2    Calcium 8.4 (L) 8.5 - 10.1 mg/dL    Bilirubin, total 0.3 0.2 - 1.0 mg/dL    AST (SGOT) 14 (L) 15 - 37 U/L    ALT (SGPT) 22 12 - 78 U/L    Alk.  phosphatase 90 45 - 117 U/L Protein, total 7.2 6.4 - 8.2 g/dL    Albumin 3.6 3.5 - 5.0 g/dL    Globulin 3.6 2.0 - 4.0 g/dL    A-G Ratio 1.0 (L) 1.1 - 2.2         Radiologic Studies -   CT ABD PELV W CONT   Final Result   Diffuse colonic distention. Pattern seems most consistent with   adynamic ileus   Irregular pleural parenchymal changes in both lung bases. CT Results  (Last 48 hours)                 02/04/22 0545  CT ABD PELV W CONT Final result    Impression:  Diffuse colonic distention. Pattern seems most consistent with   adynamic ileus   Irregular pleural parenchymal changes in both lung bases. Narrative:  PROCEDURE: CT ABD PELV W CONT       HISTORY:Abdominal pain, constipation       COMPARISON:None       Department policy stipulates all CT scans at this facility are performed using   dose reduction optimization techniques as appropriate to the performed exam,   including the following: Automated exposure control, adjustments of the mA   and/or KVP according to the patient size, and the use of iterative   reconstruction technique. LIMITATIONS: None       TECHNIQUE: Axial images with multiplanar reconstruction following intravenous   contrast.100 mL [id number]       CHEST: There are irregular areas of pleural parenchymal fibrosis in both lower   lobes. No discrete mass. No segmental infiltrates. LIVER: Normal   GALLBLADDER: Distended but otherwise unremarkable   BILIARY TREE: Normal   PANCREAS: Normal   SPLEEN: Normal   ADRENAL GLANDS: Normal   KIDNEYS/URETERS/BLADDER: Small cortical cyst on the left. Otherwise unremarkable   bladder is normal   RETROPERITONEUM/AORTA: Normal   BOWEL/MESENTERY: Stomach and duodenum are normal. Small bowel shows no intrinsic   abnormalities. Most of the colon is distended predominantly with fluid but also some fecal   material. There is no bowel wall thickening or pericolonic fat stranding. The   rectum is more normal in caliber.    APPENDIX: Identified and normal   PERITONEAL CAVITY: No free intraperitoneal air or fluid   REPRODUCTIVE ORGANS: Normal   BONE/TISSUES: No acute process       OTHER: None                  CXR Results  (Last 48 hours)      None               If you feel that you have not received excellent quality care or timely care, please ask to speak to the nurse manager. Please choose us in the future for your continued health care needs. ------------------------------------------------------------------------------------------------------------  The exam and treatment you received in the Emergency Department were for an urgent problem and are not intended as complete care. It is important that you follow-up with a doctor, nurse practitioner, or physician assistant to:  (1) confirm your diagnosis,  (2) re-evaluation of changes in your illness and treatment, and  (3) for ongoing care. If your symptoms become worse or you do not improve as expected and you are unable to reach your usual health care provider, you should return to the Emergency Department. We are available 24 hours a day. Please take your discharge instructions with you when you go to your follow-up appointment. If you have any problem arranging a follow-up appointment, contact the Emergency Department immediately. If a prescription has been provided, please have it filled as soon as possible to prevent a delay in treatment. Read the entire medication instruction sheet provided to you by the pharmacy. If you have any questions or reservations about taking the medication due to side effects or interactions with other medications, please call your primary care physician or contact the ER to speak with the charge nurse. Make an appointment with your family doctor or the physician you were referred to for follow-up of this visit as instructed on your discharge paperwork, as this is a mandatory follow-up.  Return to the ER if you are unable to be seen or if you are unable to be seen in a timely manner. If you have any problem arranging the follow-up visit, contact the Emergency Department immediately.

## 2022-02-04 NOTE — ED PROVIDER NOTES
EMERGENCY DEPARTMENT HISTORY AND PHYSICAL EXAM      Date: 2/4/2022  Patient Name: Marta Godwin      History of Presenting Illness     Chief Complaint   Patient presents with    Constipation    Abdominal Pain       History Provided By: Patient    HPI: Marta Godwin, 48 y.o. male with PMH of schizophrenia, bipolar disorder, major depression HLD, chronic back pain on chronic opiates presents to the ED with abdominal pain, abdominal distention and constipation. Patient reports that his been taking his pain medications and has not been taking stool softeners and has been having constipation. He attempted taking magnesium citrate without significant improvement. Reports that his last bowel movement was approximately 3 weeks ago. He is denying nausea and vomiting. No changes in his chronic back pain from baseline. There are no other complaints, changes, or physical findings at this time. PCP: April Pool MD    Current Outpatient Medications   Medication Sig Dispense Refill    naloxegoL (Movantik) 12.5 mg tab tablet Take 1 Tablet by mouth daily. 15 Tablet 0    ibuprofen (MOTRIN) 600 mg tablet Take 1 Tablet by mouth every six (6) hours as needed for Pain. 20 Tablet 0    diclofenac (VOLTAREN) 1 % gel Apply 2 g to affected area four (4) times daily. 100 g 5    atorvastatin (LIPITOR) 40 mg tablet Take 1 Tab by mouth daily. For lipids. Please make an appointment foradditional refills. 30 Tab 0    QUEtiapine SR (SEROQUEL XR) 300 mg sr tablet Take 300 mg by mouth nightly.  traZODone (DESYREL) 100 mg tablet Take 200 mg by mouth nightly.  haloperidol (HALDOL) 5 mg tablet Take 5 mg by mouth two (2) times a day.  clonazePAM (KLONOPIN) 1 mg tablet Take  by mouth two (2) times a day.  divalproex DR (DEPAKOTE) 500 mg tablet Take 500 mg by mouth two (2) times a day.  divalproex DR (DEPAKOTE) 250 mg tablet Take 250 mg by mouth daily.       gabapentin (NEURONTIN) 800 mg tablet Take  by mouth three (3) times daily.  traMADol (ULTRAM) 50 mg tablet Take 50 mg by mouth every eight (8) hours as needed for Pain.  oxyCODONE-acetaminophen (PERCOCET 7.5) 7.5-325 mg per tablet Take 1 Tab by mouth two (2) times daily as needed for Pain.  Dextromethorphan-guaiFENesin (MUCINEX DM) 60-1,200 mg TM12 Take 1 Tab by mouth two (2) times daily (after meals). 20 Tab 0       Past History     Past Medical History:  Past Medical History:   Diagnosis Date    Anxiety     Anxiety     Arthritis     Bipolar 2 disorder (HCC)     Blurred vision     Constipation     Coughing     Diarrhea     High cholesterol     History of back surgery     3 failed surgeries    Joint pain     all over    Joint swelling     Manic depression (HCC)     Multiple stiff joints     Muscle ache     Muscle pain     Muscle weakness     Schizophrenia (HCC)     Sinus complaint     Sleeping difficulty     SOB (shortness of breath)     Stress     TBI (traumatic brain injury) ([facility] [address]        Past Surgical History:  Past Surgical History:   Procedure Laterality Date    HX BACK SURGERY      3 failed       Family History:  Family History   Problem Relation Age of Onset    Diabetes Mother     Hypertension Mother     Heart Disease Mother     Cancer Maternal Aunt     Diabetes Maternal Aunt     Heart Disease Maternal Aunt     Hypertension Maternal Aunt        Social History:  Social History     Tobacco Use    Smoking status: Current Every Day Smoker     Packs/day: 3.00    Smokeless tobacco: Never Used   Substance Use Topics    Alcohol use: Not Currently     Alcohol/week: 1.0 standard drink     Types: 1 Cans of beer per week    Drug use: No       Allergies: Allergies   Allergen Reactions    Penicillins Other (comments)     Throat closes    Morphine Nausea and Vomiting         Review of Systems     Review of Systems   Constitutional: Negative for appetite change, chills and fever.    HENT: Negative for rhinorrhea and trouble swallowing. Eyes: Negative for photophobia and visual disturbance. Respiratory: Negative for cough and shortness of breath. Cardiovascular: Negative for chest pain and leg swelling. Gastrointestinal: Positive for abdominal pain and constipation. Negative for diarrhea, nausea and vomiting. Genitourinary: Negative for dysuria and flank pain. Musculoskeletal: Positive for arthralgias (chronic) and back pain (chronic). Skin: Negative for color change and rash. Neurological: Negative for dizziness, weakness and headaches. Physical Exam     Physical Exam  Vitals and nursing note reviewed. Constitutional:       General: He is not in acute distress. Appearance: He is not toxic-appearing. Comments: Body mass index is 33 kg/m². HENT:      Head: Normocephalic and atraumatic. Cardiovascular:      Rate and Rhythm: Normal rate and regular rhythm. Pulmonary:      Effort: Pulmonary effort is normal.      Breath sounds: Normal breath sounds. Abdominal:      General: Abdomen is protuberant. There is distension. Palpations: Abdomen is soft. Tenderness: There is generalized abdominal tenderness. Skin:     General: Skin is warm and dry. Neurological:      Mental Status: He is alert and oriented to person, place, and time. Psychiatric:         Attention and Perception: Attention normal.         Thought Content: Thought content includes suicidal ideation. Thought content does not include suicidal plan. Lab and Diagnostic Study Results     Labs -   No results found for this or any previous visit (from the past 12 hour(s)). Radiologic Studies -   [unfilled]  CT Results  (Last 48 hours)               02/04/22 0528  CT ABD PELV W CONT Final result    Impression:  Diffuse colonic distention. Pattern seems most consistent with   adynamic ileus   Irregular pleural parenchymal changes in both lung bases.                    Narrative:  PROCEDURE: CT ABD PELV W CONT HISTORY:Abdominal pain, constipation       COMPARISON:None       Department policy stipulates all CT scans at this facility are performed using   dose reduction optimization techniques as appropriate to the performed exam,   including the following: Automated exposure control, adjustments of the mA   and/or KVP according to the patient size, and the use of iterative   reconstruction technique. LIMITATIONS: None       TECHNIQUE: Axial images with multiplanar reconstruction following intravenous   contrast.100 mL [id number]       CHEST: There are irregular areas of pleural parenchymal fibrosis in both lower   lobes. No discrete mass. No segmental infiltrates. LIVER: Normal   GALLBLADDER: Distended but otherwise unremarkable   BILIARY TREE: Normal   PANCREAS: Normal   SPLEEN: Normal   ADRENAL GLANDS: Normal   KIDNEYS/URETERS/BLADDER: Small cortical cyst on the left. Otherwise unremarkable   bladder is normal   RETROPERITONEUM/AORTA: Normal   BOWEL/MESENTERY: Stomach and duodenum are normal. Small bowel shows no intrinsic   abnormalities. Most of the colon is distended predominantly with fluid but also some fecal   material. There is no bowel wall thickening or pericolonic fat stranding. The   rectum is more normal in caliber. APPENDIX: Identified and normal   PERITONEAL CAVITY: No free intraperitoneal air or fluid   REPRODUCTIVE ORGANS: Normal   BONE/TISSUES: No acute process       OTHER: None                CXR Results  (Last 48 hours)    None          Medical Decision Making and ED Course   - I am the first and primary provider for this patient AND AM THE PRIMARY PROVIDER OF RECORD. - I reviewed the vital signs, available nursing notes, past medical history, past surgical history, family history and social history. - Initial assessment performed. The patients presenting problems have been discussed, and the staff are in agreement with the care plan formulated and outlined with them.   I have encouraged them to ask questions as they arise throughout their visit. Vital Signs-Reviewed the patient's vital signs. Patient Vitals for the past 24 hrs:   Temp Pulse Resp BP SpO2   02/04/22 0603 -- 89 19 (!) 156/95 97 %   02/04/22 0333 99 °F (37.2 °C) 100 18 (!) 177/94 95 %       Records Reviewed: Nursing Notes    Provider Notes (Medical Decision Making):     #Abdominal pain and constipation. This is caused likely by chronic opiate use in the setting of not taking stool softeners. Patient has abdominal distention mild generalized abdominal pain without vomiting. My other concerns are that the patient actually has a partial SBO. Plan is to obtain CBC, chemistry, CT abdomen pelvis with IV contrast.  #Suicidal ideation: Patient does not have any plan. He reports he is suicidal every single day of his life. On nursing screening for suicidality patient's pain is moderate. Once the patient is medically cleared, I will contact the psychiatrist on-call for further recommendation further disposition. ED Course:       ED Course as of 02/04/22 2201 Fri Feb 04, 2022   0710 Patient reports that he went to the bathroom and has had a significant amount of stool several times and feels improved. Pending final reads of CT. [AA]   0741 CT ABD PELV W CONT  CT read was discussed with the gastroenterologist on-call Dr. Clement Seymour. He advised making sure that patient can defecate. Upon discharge, patient can be prescribed Movantik. [AA]   [id number] Patient was evaluated by behavioral health for his suicidal ideation. He was deemed safe to go home and has a safety plan. He was also given resources. [AA]      ED Course User Index  [AA] Bhargav Houston MD           Disposition     Disposition: Condition improved  DC- Adult Discharges: All of the diagnostic tests were reviewed and questions answered. Diagnosis, care plan and treatment options were discussed.   The patient understands the instructions and will follow up as directed. The patients results have been reviewed with them. They have been counseled regarding their diagnosis. The patient verbally convey understanding and agreement of the signs, symptoms, diagnosis, treatment and prognosis and additionally agrees to follow up as recommended with their PCP in 24 - 48 hours. They also agree with the care-plan and convey that all of their questions have been answered. I have also put together some discharge instructions for them that include: 1) educational information regarding their diagnosis, 2) how to care for their diagnosis at home, as well a 3) list of reasons why they would want to return to the ED prior to their follow-up appointment, should their condition change. Discharged      DISCHARGE PLAN:  1. Current Discharge Medication List      CONTINUE these medications which have NOT CHANGED    Details   ibuprofen (MOTRIN) 600 mg tablet Take 1 Tablet by mouth every six (6) hours as needed for Pain. Qty: 20 Tablet, Refills: 0      diclofenac (VOLTAREN) 1 % gel Apply 2 g to affected area four (4) times daily. Qty: 100 g, Refills: 5      atorvastatin (LIPITOR) 40 mg tablet Take 1 Tab by mouth daily. For lipids. Please make an appointment foradditional refills. Qty: 30 Tab, Refills: 0    Associated Diagnoses: Hyperlipidemia, mixed      QUEtiapine SR (SEROQUEL XR) 300 mg sr tablet Take 300 mg by mouth nightly. traZODone (DESYREL) 100 mg tablet Take 200 mg by mouth nightly.      haloperidol (HALDOL) 5 mg tablet Take 5 mg by mouth two (2) times a day. clonazePAM (KLONOPIN) 1 mg tablet Take  by mouth two (2) times a day. !! divalproex DR (DEPAKOTE) 500 mg tablet Take 500 mg by mouth two (2) times a day. !! divalproex DR (DEPAKOTE) 250 mg tablet Take 250 mg by mouth daily. gabapentin (NEURONTIN) 800 mg tablet Take  by mouth three (3) times daily. traMADol (ULTRAM) 50 mg tablet Take 50 mg by mouth every eight (8) hours as needed for Pain. oxyCODONE-acetaminophen (PERCOCET 7.5) 7.5-325 mg per tablet Take 1 Tab by mouth two (2) times daily as needed for Pain. Dextromethorphan-guaiFENesin (MUCINEX DM) 60-1,200 mg TM12 Take 1 Tab by mouth two (2) times daily (after meals). Qty: 20 Tab, Refills: 0    Associated Diagnoses: Viral upper respiratory tract infection with cough       !! - Potential duplicate medications found. Please discuss with provider. 2.   Follow-up Information     Follow up With Specialties Details Why [facility]    [facility] EMERGENCY DEPT Emergency Medicine Go to  As needed, If symptoms worsen [address] [facility] Norm Preciado MD Family Medicine Schedule an appointment as soon as possible for a visit in 3 days For reevaluation, Discuss your visit to the [address] [place] [address]  [place] [address] [facility]  [phone]          3. Return to ED if worse   4. Discharge Medication List as of 2/4/2022  8:28 AM      START taking these medications    Details   naloxegoL (Movantik) 12.5 mg tab tablet Take 1 Tablet by mouth daily. , Normal, Disp-15 Tablet, R-0         CONTINUE these medications which have NOT CHANGED    Details   ibuprofen (MOTRIN) 600 mg tablet Take 1 Tablet by mouth every six (6) hours as needed for Pain., Normal, Disp-20 Tablet, R-0      diclofenac (VOLTAREN) 1 % gel Apply 2 g to affected area four (4) times daily. , Normal, Disp-100 g,R-5      atorvastatin (LIPITOR) 40 mg tablet Take 1 Tab by mouth daily. For lipids. Please make an appointment foradditional refills. , Normal, Disp-30 Tab,R-0      QUEtiapine SR (SEROQUEL XR) 300 mg sr tablet Take 300 mg by mouth nightly., Historical Med      traZODone (DESYREL) 100 mg tablet Take 200 mg by mouth nightly., Historical Med      haloperidol (HALDOL) 5 mg tablet Take 5 mg by mouth two (2) times a day., Historical Med      clonazePAM (KLONOPIN) 1 mg tablet Take  by mouth two (2) times a day., Historical Med      !! divalproex  (DEPAKOTE) 500 mg tablet Take 500 mg by mouth two (2) times a day., Historical Med      !! divalproex DR (DEPAKOTE) 250 mg tablet Take 250 mg by mouth daily. , Historical Med      gabapentin (NEURONTIN) 800 mg tablet Take  by mouth three (3) times daily. , Historical Med      traMADol (ULTRAM) 50 mg tablet Take 50 mg by mouth every eight (8) hours as needed for Pain., Historical Med      oxyCODONE-acetaminophen (PERCOCET 7.5) 7.5-325 mg per tablet Take 1 Tab by mouth two (2) times daily as needed for Pain., Historical Med      Dextromethorphan-guaiFENesin (MUCINEX DM) 60-1,200 mg TM12 Take 1 Tab by mouth two (2) times daily (after meals). , Normal, Disp-20 Tab, R-0       !! - Potential duplicate medications found. Please discuss with provider. Diagnosis     Clinical Impression:   1. Drug-induced constipation    2. Elevated blood pressure reading    3. History of suicidal ideation        Attestations: Dayana Guadarrama MD    Please note that this dictation was completed with [employer], the [employer] voice recognition software. Quite often unanticipated grammatical, syntax, homophones, and other interpretive errors are inadvertently transcribed by the computer software. Please disregard these errors. Please excuse any errors that have escaped final proofreading. Thank you.

## 2022-02-04 NOTE — ED NOTES
Pt in [facility] yelling \"I want to go home!\", pt doesn't comprehend that he made a suicidal thought to the triage nurse and cannot leave until assessed by [address] [place], pt walked back into room yelling to himself

## 2022-02-04 NOTE — ED TRIAGE NOTES
Pt having abd pain and constipation take oxy for back pain and is in pain management. Normally he takes magnesium citrate and it works however tonight he took 4 bottles with no relief.  Last BM was 3W

## 2022-02-04 NOTE — ED NOTES
When assessing SI question pt states he is always SI every day. No plan at this time. No intake at this time DREdward To Dr. Dr. Ladi Box notified no new orders received.

## 2022-02-04 NOTE — BSMART NOTE
Pt arrived at ED via private vehicle (family) and assessed in ED 10    Pt presented with Denies SI and Denies HI     Pt presented with disheveled appearance. Pt thought process circumstantial    Pt cognition  appropriate decision making    Pt reports has been hospitalized 0 times     Most Recent Hospitalizations if any: NA    Pt reports Outpatient Psychiatrist Renato    Pt does not have a hx of legal issues. Pt does not have hx of violence/aggression     Pt reports no substance use    Pt UDS positive for: no result    Hx. Of Substance Treatment: NO  When: Not Applicable  Where: Not Applicable    Highest Level of Education: 12th grade    Employment: NO    Source of Income: disability    Housing: Independent Housing    Access to Weapons: NO    If weapons, Have they been removed: N/A    Decision Making:    Does Patient have a guardian/POA: NO    If so, Name of Guardian/POA: NA    Contact Information: na    Was Paperwork Provided?: NO    If not, Was it Requested:NO                                                                      Who to Follow Up With for Paperwork:na    Was Information Emailed to Director and Supervisor: NO    Trauma Hx:   Sexual: NO  When:  Not Applicable By Whom:Not Applicable    Physical: NO  When: Not Applicable By Whom:Not Applicable    Verbal: NO  When: Not Applicable By Whom:Not Applicable      Family Support: YES    Who: Brother      Dr. Avtar Ellison contacted and reports pt does not meet inpatient level of care and will follow up with resources outpatient as needed. This writer notified assigned WINSOME Krishnamurthy. Safety Plan Completed: N/A        PATIENT NARRATIVE SUMMARY:  Pt assessed face to face in ED. Pt came to ED with c/o abd pain and constipation. Intake was asked to see pt after suicide questions by ED. He stated that he has thought of SI in the past from time to time but denies SI at this time. Pt denies SI HI Hallucinations.   States he sees Dr Janneth Vazquez and talked with him last month. Pt feels safe to d/c home and lives with his brother. This writer will follow up as needed.

## 2022-02-12 ENCOUNTER — APPOINTMENT (OUTPATIENT)
Dept: GENERAL RADIOLOGY | Age: 47
End: 2022-02-12
Attending: STUDENT IN AN ORGANIZED HEALTH CARE EDUCATION/TRAINING PROGRAM
Payer: MEDICARE

## 2022-02-12 ENCOUNTER — HOSPITAL ENCOUNTER (EMERGENCY)
Age: 47
Discharge: HOME | End: 2022-02-12
Attending: STUDENT IN AN ORGANIZED HEALTH CARE EDUCATION/TRAINING PROGRAM
Payer: MEDICARE

## 2022-02-12 VITALS
BODY MASS INDEX: 32.93 KG/M2 | DIASTOLIC BLOOD PRESSURE: 98 MMHG | HEART RATE: 105 BPM | SYSTOLIC BLOOD PRESSURE: 138 MMHG | WEIGHT: 230 LBS | HEIGHT: 70 IN | OXYGEN SATURATION: 97 % | RESPIRATION RATE: 18 BRPM | TEMPERATURE: 98.4 F

## 2022-02-12 DIAGNOSIS — R19.7 DIARRHEA, UNSPECIFIED TYPE: Primary | ICD-10-CM

## 2022-02-12 PROCEDURE — 74018 RADEX ABDOMEN 1 VIEW: CPT

## 2022-02-12 PROCEDURE — 99283 EMERGENCY DEPT VISIT LOW MDM: CPT

## 2022-02-12 NOTE — ED PROVIDER NOTES
[id number]  EMERGENCY DEPARTMENT ENCOUNTER NOTE    Date: 2/12/2022  Patient Name: Licha Rebolledo    History of Presenting Illness     Chief Complaint   Patient presents with    Constipation     HPI: Licha Rebolledo, 48 y.o. male with a past medical history and outpatient medications as listed and reviewed below  presents for constipation. Patient is on chronic opiates for the past \"30 years or so\". He started having constipation a month ago and has been having only watery nonbloody diarrhea. He has intermittent bright red blood but reports that it is due to his hemorrhoids. No lightheadedness or dizziness. He came in for remittent abdominal cramps with the diarrhea. He was evaluated on 2/4 and was found to have ileus with constipation was discharged home with laxatives. He has been put on senna, lactulose, fibers, and other treatments which he has been taking every day. He is coming in today because he feels that he has not been able to pass a formed bowel movement and has been having recurrent watery movements with abdominal cramps.     Medical History   I reviewed the medical, surgical, family, and social history, as well as allergies:    PCP: Unknown, Provider, DPM    Past Medical History:  Past Medical History:   Diagnosis Date    Anxiety     Anxiety     Arthritis     Bipolar 2 disorder ([address]     Blurred vision     Constipation     Coughing     Diarrhea     High cholesterol     History of back surgery     3 failed surgeries    Joint pain     all over    Joint swelling     Manic depression ([address]     Multiple stiff joints     Muscle ache     Muscle pain     Muscle weakness     Schizophrenia ([address]     Sinus complaint     Sleeping difficulty     SOB (shortness of breath)     Stress     TBI (traumatic brain injury) ([address]      Past Surgical History:  Past Surgical History:   Procedure Laterality Date    HX BACK SURGERY      3 failed     Current Outpatient Medications:  Current Outpatient Medications   Medication Instructions    atorvastatin (LIPITOR) 40 mg tablet Take 1 Tab by mouth daily. For lipids. Please make an appointment foradditional refills.  clonazePAM (KLONOPIN) 1 mg tablet Oral, 2 TIMES DAILY    Dextromethorphan-guaiFENesin (MUCINEX DM) 60-1,200 mg TM12 1 Tablet, Oral, 2 TIMES DAILY AFTER MEALS    diclofenac (VOLTAREN) 2 g, Topical, 4 TIMES DAILY    divalproex DR (DEPAKOTE) 500 mg, Oral, 2 TIMES DAILY    divalproex DR (DEPAKOTE) 250 mg, Oral, DAILY    gabapentin (NEURONTIN) 800 mg tablet Oral, 3 TIMES DAILY    haloperidoL (HALDOL) 5 mg, Oral, 2 TIMES DAILY    ibuprofen (MOTRIN) 600 mg, Oral, EVERY 6 HOURS AS NEEDED    naloxegoL (MOVANTIK) 12.5 mg, Oral, DAILY    oxyCODONE-acetaminophen (PERCOCET 7.5) 7.5-325 mg per tablet 1 Tablet, Oral, 2 TIMES DAILY AS NEEDED    QUEtiapine SR (SEROQUEL XR) 300 mg, Oral, EVERY BEDTIME    traMADoL (ULTRAM) 50 mg, Oral, EVERY 8 HOURS AS NEEDED    traZODone (DESYREL) 200 mg, Oral, EVERY BEDTIME      Family History:  Family History   Problem Relation Age of Onset    Diabetes Mother     Hypertension Mother     Heart Disease Mother     Cancer Maternal Aunt     Diabetes Maternal Aunt     Heart Disease Maternal Aunt     Hypertension Maternal Aunt      Social History:  Social History     Tobacco Use    Smoking status: Current Every Day Smoker     Packs/day: 3.00    Smokeless tobacco: Never Used   Substance Use Topics    Alcohol use: Not Currently     Alcohol/week: 1.0 standard drink     Types: 1 Cans of beer per week    Drug use: No     Allergies: Allergies   Allergen Reactions    Penicillins Other (comments)     Throat closes    Morphine Nausea and Vomiting       Review of Systems     Review of Systems  Negative: All other systems negative. Physical Exam and Vital Signs   Vital Signs - Reviewed the patient's vital signs.     Patient Vitals for the past 12 hrs:   Temp Pulse Resp BP SpO2 02/12/22 1257 98.4 °F (36.9 °C) (!) 105 18 (!) 138/98 97 %     Physical Exam:    GENERAL: awake, alert, cooperative, not in distress  HEENT:  * Pupils equal, EOMI  * Head atraumatic  CV:  * regular rhythm  * warm and perfused extremities bilaterally  PULMONARY: Good air movement, no wheezes or crackles  ABDOMEN: soft, not distended, no guarding, noted LLQ tenderness to palpation. On distraction, the patient does not have any tenderness and was grimacing even prior to palpating his left upper quadrant but distractible with conversation. : No suprapubic tenderness  EXTREMITIES/BACK: warm and perfused, no tenderness, no edema  SKIN: no rashes or signs of trauma  NEURO:  * Speech clear  * Moves U&LE to command    Medical Decision Making   - I am the first and primary provider for this patient and am the primary provider of record. - I reviewed the vital signs, available nursing notes, past medical history, past surgical history, family history and social history. - Initial assessment performed. The patients presenting problems have been discussed, and the staff are in agreement with the care plan formulated and outlined with them. I have encouraged them to ask questions as they arise throughout their visit. - Available medical records, nursing notes, old EKGs, and EMS run sheets (if patient was EMS transported) were reviewed    MDM:   Patient is a 48 y.o. male presenting for AP and constipation. Vitals reveal no significant abnormalities and physical exam reveals LLQ tenderness without guarding. Based on the history, physical exam, risk factors, and vitals signs, differential includes:  functional constipation, opiate induced ileus. No concern for obstruction if the patient does have any guarding rebound and has been having the symptoms for a month. Will initiate workup and symptomatic treatment. See ED Course and Reassessment for results and interpretations.     Results     Labs:  No results found for this or any previous visit (from the past 12 hour(s)). Radiologic Studies:  CT Results  (Last 48 hours)    None        CXR Results  (Last 48 hours)    None        Medications ordered:  Medications - No data to display  ED Course and Reassessment     ED Course:     ED Course as of 02/12/22 1619   Sat Feb 12, 2022   1406 KUB is within normal limits without any signs of obstruction. [SS]      ED Course User Index  [SS] Sammy Kent MD       Reassessment:    He is negative for acute process. Rectal exam is negative for impaction. Patient likely has cramps due to significant laxative use and is having diarrhea due to laxative use. The patient was told to stop the lactulose and continue with the other chronic laxatives he is taking due to the opiate use. Will discharge in stable condition. Final Disposition     Discharge: DISCHARGED FROM EMERGENCY DEPARTMENT    Patient will be discharged from the Emergency Department in stable condition. All of the diagnostic tests were reviewed and any questions were answered. Diagnosis, results, follow up if applicable, and return precautions were discussed. I have also put together printed discharge instructions for them that include: 1) educational information regarding their diagnosis, 2) how to care for their diagnosis at home, as well a 3) list of reasons why they would want to return to the ED prior to their follow-up appointment, should their condition change. Any labs or imaging done in the ED will be either printed with the discharge paperwork or available through [address] DISCHARGE PLAN:  1. Current Discharge Medication List      CONTINUE these medications which have NOT CHANGED    Details   naloxegoL (Movantik) 12.5 mg tab tablet Take 1 Tablet by mouth daily. Qty: 15 Tablet, Refills: 0      ibuprofen (MOTRIN) 600 mg tablet Take 1 Tablet by mouth every six (6) hours as needed for Pain.   Qty: 20 Tablet, Refills: 0      diclofenac (VOLTAREN) 1 % gel Apply 2 g to affected area four (4) times daily. Qty: 100 g, Refills: 5      atorvastatin (LIPITOR) 40 mg tablet Take 1 Tab by mouth daily. For lipids. Please make an appointment foradditional refills. Qty: 30 Tab, Refills: 0    Associated Diagnoses: Hyperlipidemia, mixed      QUEtiapine SR (SEROQUEL XR) 300 mg sr tablet Take 300 mg by mouth nightly. traZODone (DESYREL) 100 mg tablet Take 200 mg by mouth nightly.      haloperidol (HALDOL) 5 mg tablet Take 5 mg by mouth two (2) times a day. clonazePAM (KLONOPIN) 1 mg tablet Take  by mouth two (2) times a day. !! divalproex DR (DEPAKOTE) 500 mg tablet Take 500 mg by mouth two (2) times a day. !! divalproex DR (DEPAKOTE) 250 mg tablet Take 250 mg by mouth daily. gabapentin (NEURONTIN) 800 mg tablet Take  by mouth three (3) times daily. traMADol (ULTRAM) 50 mg tablet Take 50 mg by mouth every eight (8) hours as needed for Pain. oxyCODONE-acetaminophen (PERCOCET 7.5) 7.5-325 mg per tablet Take 1 Tab by mouth two (2) times daily as needed for Pain. Dextromethorphan-guaiFENesin (MUCINEX DM) 60-1,200 mg TM12 Take 1 Tab by mouth two (2) times daily (after meals). Qty: 20 Tab, Refills: 0    Associated Diagnoses: Viral upper respiratory tract infection with cough       !! - Potential duplicate medications found. Please discuss with provider. 2.   Follow-up Information     Follow up With Specialties Details Why Contact Info    Your doctor  Schedule an appointment as soon as possible for a visit in 1 week      [facility] EMERGENCY DEPT Emergency Medicine Go to  If symptoms worsen [address] [facility] [address] [phone]        3. Return to ED if worse    4. Current Discharge Medication List        Diagnosis     Clinical Impression:   1. Diarrhea, unspecified type      Attestations:    Paulie Childers MD    Please note that this dictation was completed with [employer], the [employer] voice recognition software.   Quite often unanticipated grammatical, syntax, homophones, and other interpretive errors are inadvertently transcribed by the computer software. Please disregard these errors. Please excuse any errors that have escaped final proofreading. Thank you.

## 2022-02-12 NOTE — DISCHARGE INSTRUCTIONS
Thank you! Thank you for allowing me to care for you in the emergency department. I sincerely hope that you are satisfied with your visit today. It is my goal to provide you with excellent care. Below you will find a list of your labs and imaging from your visit today. Should you have any questions regarding these results please do not hesitate to call the emergency department. Labs -   No results found for this or any previous visit (from the past 12 hour(s)). Radiologic Studies -   XR ABD (KUB)   Final Result        CT Results  (Last 48 hours)      None          CXR Results  (Last 48 hours)      None               If you feel that you have not received excellent quality care or timely care, please ask to speak to the nurse manager. Please choose us in the future for your continued health care needs. ------------------------------------------------------------------------------------------------------------  The exam and treatment you received in the Emergency Department were for an urgent problem and are not intended as complete care. It is important that you follow-up with a doctor, nurse practitioner, or physician assistant to:  (1) confirm your diagnosis,  (2) re-evaluation of changes in your illness and treatment, and  (3) for ongoing care. If your symptoms become worse or you do not improve as expected and you are unable to reach your usual health care provider, you should return to the Emergency Department. We are available 24 hours a day. Please take your discharge instructions with you when you go to your follow-up appointment. If a prescription has been provided, please have it filled as soon as possible to prevent a delay in treatment. Read the entire medication instruction sheet provided to you by the pharmacy.  If you have any questions or reservations about taking the medication due to side effects or interactions with other medications, please call your primary care physician or contact the ER to speak with the charge nurse. Make an appointment with your family doctor or the physician you were referred to for follow-up of this visit as instructed on your discharge paperwork, as this is a mandatory follow-up. Return to the ER if you are unable to be seen or if you are unable to be seen in a timely manner. If you have any problem arranging the follow-up visit, contact the Emergency Department immediately.

## 2023-05-14 RX ORDER — TRAMADOL HYDROCHLORIDE 50 MG/1
50 TABLET ORAL EVERY 8 HOURS PRN
COMMUNITY

## 2023-05-14 RX ORDER — HALOPERIDOL 5 MG/1
5 TABLET ORAL 2 TIMES DAILY
COMMUNITY

## 2023-05-14 RX ORDER — TRAZODONE HYDROCHLORIDE 100 MG/1
200 TABLET ORAL NIGHTLY
COMMUNITY

## 2023-05-14 RX ORDER — GABAPENTIN 800 MG/1
TABLET ORAL 3 TIMES DAILY
COMMUNITY

## 2023-05-14 RX ORDER — CLONAZEPAM 1 MG/1
TABLET ORAL 3 TIMES DAILY PRN
COMMUNITY

## 2023-05-14 RX ORDER — ATORVASTATIN CALCIUM 40 MG/1
TABLET, FILM COATED ORAL
COMMUNITY
Start: 2016-11-21

## 2023-05-14 RX ORDER — OXYCODONE AND ACETAMINOPHEN 7.5; 325 MG/1; MG/1
1 TABLET ORAL 2 TIMES DAILY PRN
COMMUNITY

## 2023-05-14 RX ORDER — QUETIAPINE 300 MG/1
300 TABLET, FILM COATED, EXTENDED RELEASE ORAL NIGHTLY
COMMUNITY

## 2023-05-14 RX ORDER — IBUPROFEN 600 MG/1
600 TABLET ORAL EVERY 6 HOURS PRN
COMMUNITY
Start: 2021-08-14

## 2023-05-14 RX ORDER — DIVALPROEX SODIUM 500 MG/1
500 TABLET, DELAYED RELEASE ORAL 2 TIMES DAILY
COMMUNITY

## 2023-05-14 RX ORDER — DIVALPROEX SODIUM 250 MG/1
250 TABLET, DELAYED RELEASE ORAL DAILY
COMMUNITY

## 2023-06-08 ENCOUNTER — OFFICE VISIT (OUTPATIENT)
Age: 48
End: 2023-06-08
Payer: MEDICARE

## 2023-06-08 VITALS
HEART RATE: 97 BPM | RESPIRATION RATE: 16 BRPM | BODY MASS INDEX: 28.35 KG/M2 | SYSTOLIC BLOOD PRESSURE: 115 MMHG | OXYGEN SATURATION: 96 % | TEMPERATURE: 98.4 F | WEIGHT: 198 LBS | HEIGHT: 70 IN | DIASTOLIC BLOOD PRESSURE: 72 MMHG

## 2023-06-08 DIAGNOSIS — D17.0 LIPOMA OF FACE: ICD-10-CM

## 2023-06-08 PROCEDURE — G8419 CALC BMI OUT NRM PARAM NOF/U: HCPCS | Performed by: COLON & RECTAL SURGERY

## 2023-06-08 PROCEDURE — G8427 DOCREV CUR MEDS BY ELIG CLIN: HCPCS | Performed by: COLON & RECTAL SURGERY

## 2023-06-08 PROCEDURE — 4004F PT TOBACCO SCREEN RCVD TLK: CPT | Performed by: COLON & RECTAL SURGERY

## 2023-06-08 PROCEDURE — 99203 OFFICE O/P NEW LOW 30 MIN: CPT | Performed by: COLON & RECTAL SURGERY

## 2023-06-08 RX ORDER — OXYCODONE 18 MG/1
CAPSULE, EXTENDED RELEASE ORAL
COMMUNITY
Start: 2023-05-16 | End: 2023-06-08 | Stop reason: ALTCHOICE

## 2023-06-08 RX ORDER — RIVAROXABAN 20 MG/1
20 TABLET, FILM COATED ORAL DAILY
COMMUNITY
Start: 2023-04-11

## 2023-06-08 RX ORDER — OXYCODONE HYDROCHLORIDE 20 MG/1
TABLET ORAL
COMMUNITY
Start: 2023-05-12

## 2023-06-08 RX ORDER — HYDROMORPHONE HYDROCHLORIDE 4 MG/1
TABLET ORAL
COMMUNITY
Start: 2023-06-02

## 2023-06-08 RX ORDER — ATOGEPANT 60 MG/1
1 TABLET ORAL DAILY
COMMUNITY
Start: 2023-05-11

## 2023-06-08 RX ORDER — GABAPENTIN 600 MG/1
600 TABLET ORAL 4 TIMES DAILY
COMMUNITY
Start: 2023-05-12

## 2023-06-08 RX ORDER — MELOXICAM 15 MG/1
TABLET ORAL
COMMUNITY
Start: 2023-06-07

## 2023-06-08 ASSESSMENT — PATIENT HEALTH QUESTIONNAIRE - PHQ9
SUM OF ALL RESPONSES TO PHQ9 QUESTIONS 1 & 2: 0
2. FEELING DOWN, DEPRESSED OR HOPELESS: 0
SUM OF ALL RESPONSES TO PHQ QUESTIONS 1-9: 0
1. LITTLE INTEREST OR PLEASURE IN DOING THINGS: 0
SUM OF ALL RESPONSES TO PHQ QUESTIONS 1-9: 0

## 2023-07-02 PROBLEM — D17.0 LIPOMA OF FACE: Status: ACTIVE | Noted: 2023-07-02

## 2023-07-12 ENCOUNTER — TELEPHONE (OUTPATIENT)
Age: 48
End: 2023-07-12

## 2023-07-12 NOTE — TELEPHONE ENCOUNTER
Deyanira, with PAT called in to see why the patient has been added back to the surgery schedule. She states that he will need hematology clearance for his surgery.  Please call PAT back at [phone]

## 2023-07-12 NOTE — TELEPHONE ENCOUNTER
Called patient after speaking with Dr Madison Lee, advised him to stop taking Xarelto. He understood. I also called PAT back and let them know that the clearance is in the patients chart. Patient is still on for surgery on 7/14.

## 2023-07-14 ENCOUNTER — ANESTHESIA EVENT (OUTPATIENT)
Facility: HOSPITAL | Age: 48
End: 2023-07-14
Payer: MEDICARE

## 2023-07-14 ENCOUNTER — ANESTHESIA (OUTPATIENT)
Facility: HOSPITAL | Age: 48
End: 2023-07-14
Payer: MEDICARE

## 2023-07-14 ENCOUNTER — HOSPITAL ENCOUNTER (OUTPATIENT)
Facility: HOSPITAL | Age: 48
Discharge: HOME | End: 2023-07-14
Attending: COLON & RECTAL SURGERY | Admitting: COLON & RECTAL SURGERY
Payer: MEDICARE

## 2023-07-14 VITALS
WEIGHT: 175 LBS | OXYGEN SATURATION: 100 % | HEIGHT: 70 IN | DIASTOLIC BLOOD PRESSURE: 67 MMHG | TEMPERATURE: 97.5 F | BODY MASS INDEX: 25.05 KG/M2 | SYSTOLIC BLOOD PRESSURE: 100 MMHG | HEART RATE: 69 BPM | RESPIRATION RATE: 18 BRPM

## 2023-07-14 DIAGNOSIS — D17.0 LIPOMA OF FACE: ICD-10-CM

## 2023-07-14 DIAGNOSIS — L72.0 EPIDERMOID CYST OF FACE: Primary | ICD-10-CM

## 2023-07-14 PROCEDURE — 6360000002 HC RX W HCPCS

## 2023-07-14 PROCEDURE — 88304 TISSUE EXAM BY PATHOLOGIST: CPT

## 2023-07-14 PROCEDURE — 3700000000 HC ANESTHESIA ATTENDED CARE: Performed by: COLON & RECTAL SURGERY

## 2023-07-14 PROCEDURE — 7100000011 HC PHASE II RECOVERY - ADDTL 15 MIN: Performed by: COLON & RECTAL SURGERY

## 2023-07-14 PROCEDURE — 6360000002 HC RX W HCPCS: Performed by: COLON & RECTAL SURGERY

## 2023-07-14 PROCEDURE — 7100000001 HC PACU RECOVERY - ADDTL 15 MIN: Performed by: COLON & RECTAL SURGERY

## 2023-07-14 PROCEDURE — 3700000001 HC ADD 15 MINUTES (ANESTHESIA): Performed by: COLON & RECTAL SURGERY

## 2023-07-14 PROCEDURE — 2500000003 HC RX 250 WO HCPCS

## 2023-07-14 PROCEDURE — 7100000000 HC PACU RECOVERY - FIRST 15 MIN: Performed by: COLON & RECTAL SURGERY

## 2023-07-14 PROCEDURE — 2709999900 HC NON-CHARGEABLE SUPPLY: Performed by: COLON & RECTAL SURGERY

## 2023-07-14 PROCEDURE — 11442 EXC FACE-MM B9+MARG 1.1-2 CM: CPT | Performed by: COLON & RECTAL SURGERY

## 2023-07-14 PROCEDURE — 3600000012 HC SURGERY LEVEL 2 ADDTL 15MIN: Performed by: COLON & RECTAL SURGERY

## 2023-07-14 PROCEDURE — 7100000010 HC PHASE II RECOVERY - FIRST 15 MIN: Performed by: COLON & RECTAL SURGERY

## 2023-07-14 PROCEDURE — 2580000003 HC RX 258: Performed by: COLON & RECTAL SURGERY

## 2023-07-14 PROCEDURE — 3600000002 HC SURGERY LEVEL 2 BASE: Performed by: COLON & RECTAL SURGERY

## 2023-07-14 PROCEDURE — C9399 UNCLASSIFIED DRUGS OR BIOLOG: HCPCS

## 2023-07-14 RX ORDER — IPRATROPIUM BROMIDE AND ALBUTEROL SULFATE 2.5; .5 MG/3ML; MG/3ML
1 SOLUTION RESPIRATORY (INHALATION)
Status: DISCONTINUED | OUTPATIENT
Start: 2023-07-14 | End: 2023-07-14 | Stop reason: HOSPADM

## 2023-07-14 RX ORDER — DIPHENHYDRAMINE HYDROCHLORIDE 50 MG/ML
12.5 INJECTION INTRAMUSCULAR; INTRAVENOUS
Status: DISCONTINUED | OUTPATIENT
Start: 2023-07-14 | End: 2023-07-14 | Stop reason: HOSPADM

## 2023-07-14 RX ORDER — LORAZEPAM 2 MG/ML
0.5 INJECTION INTRAMUSCULAR
Status: DISCONTINUED | OUTPATIENT
Start: 2023-07-14 | End: 2023-07-14 | Stop reason: HOSPADM

## 2023-07-14 RX ORDER — BUPIVACAINE HYDROCHLORIDE 5 MG/ML
INJECTION, SOLUTION EPIDURAL; INTRACAUDAL PRN
Status: DISCONTINUED | OUTPATIENT
Start: 2023-07-14 | End: 2023-07-14 | Stop reason: HOSPADM

## 2023-07-14 RX ORDER — SODIUM CHLORIDE 0.9 % (FLUSH) 0.9 %
5-40 SYRINGE (ML) INJECTION PRN
Status: DISCONTINUED | OUTPATIENT
Start: 2023-07-14 | End: 2023-07-14 | Stop reason: HOSPADM

## 2023-07-14 RX ORDER — FENTANYL CITRATE 50 UG/ML
50 INJECTION, SOLUTION INTRAMUSCULAR; INTRAVENOUS EVERY 5 MIN PRN
Status: DISCONTINUED | OUTPATIENT
Start: 2023-07-14 | End: 2023-07-14 | Stop reason: HOSPADM

## 2023-07-14 RX ORDER — FENTANYL CITRATE 50 UG/ML
INJECTION, SOLUTION INTRAMUSCULAR; INTRAVENOUS PRN
Status: DISCONTINUED | OUTPATIENT
Start: 2023-07-14 | End: 2023-07-14 | Stop reason: SDUPTHER

## 2023-07-14 RX ORDER — SUCCINYLCHOLINE/SOD CL,ISO/PF 200MG/10ML
SYRINGE (ML) INTRAVENOUS PRN
Status: DISCONTINUED | OUTPATIENT
Start: 2023-07-14 | End: 2023-07-14 | Stop reason: SDUPTHER

## 2023-07-14 RX ORDER — PROPOFOL 10 MG/ML
INJECTION, EMULSION INTRAVENOUS CONTINUOUS PRN
Status: DISCONTINUED | OUTPATIENT
Start: 2023-07-14 | End: 2023-07-14 | Stop reason: SDUPTHER

## 2023-07-14 RX ORDER — METOCLOPRAMIDE HYDROCHLORIDE 5 MG/ML
10 INJECTION INTRAMUSCULAR; INTRAVENOUS
Status: DISCONTINUED | OUTPATIENT
Start: 2023-07-14 | End: 2023-07-14 | Stop reason: HOSPADM

## 2023-07-14 RX ORDER — SODIUM CHLORIDE 0.9 % (FLUSH) 0.9 %
5-40 SYRINGE (ML) INJECTION EVERY 12 HOURS SCHEDULED
Status: DISCONTINUED | OUTPATIENT
Start: 2023-07-14 | End: 2023-07-14 | Stop reason: HOSPADM

## 2023-07-14 RX ORDER — OXYCODONE HYDROCHLORIDE 5 MG/1
5 TABLET ORAL PRN
Status: DISCONTINUED | OUTPATIENT
Start: 2023-07-14 | End: 2023-07-14 | Stop reason: HOSPADM

## 2023-07-14 RX ORDER — SODIUM CHLORIDE 9 MG/ML
INJECTION, SOLUTION INTRAVENOUS PRN
Status: DISCONTINUED | OUTPATIENT
Start: 2023-07-14 | End: 2023-07-14 | Stop reason: HOSPADM

## 2023-07-14 RX ORDER — MEPERIDINE HYDROCHLORIDE 25 MG/ML
12.5 INJECTION INTRAMUSCULAR; INTRAVENOUS; SUBCUTANEOUS EVERY 5 MIN PRN
Status: DISCONTINUED | OUTPATIENT
Start: 2023-07-14 | End: 2023-07-14 | Stop reason: HOSPADM

## 2023-07-14 RX ORDER — PROPOFOL 10 MG/ML
INJECTION, EMULSION INTRAVENOUS PRN
Status: DISCONTINUED | OUTPATIENT
Start: 2023-07-14 | End: 2023-07-14 | Stop reason: SDUPTHER

## 2023-07-14 RX ORDER — OXYCODONE HYDROCHLORIDE 5 MG/1
10 TABLET ORAL PRN
Status: DISCONTINUED | OUTPATIENT
Start: 2023-07-14 | End: 2023-07-14 | Stop reason: HOSPADM

## 2023-07-14 RX ORDER — OXYCODONE HYDROCHLORIDE AND ACETAMINOPHEN 5; 325 MG/1; MG/1
1 TABLET ORAL EVERY 4 HOURS PRN
Qty: 18 TABLET | Refills: 0 | Status: SHIPPED | OUTPATIENT
Start: 2023-07-14 | End: 2023-07-19

## 2023-07-14 RX ORDER — DEXAMETHASONE SODIUM PHOSPHATE 4 MG/ML
INJECTION, SOLUTION INTRA-ARTICULAR; INTRALESIONAL; INTRAMUSCULAR; INTRAVENOUS; SOFT TISSUE PRN
Status: DISCONTINUED | OUTPATIENT
Start: 2023-07-14 | End: 2023-07-14 | Stop reason: SDUPTHER

## 2023-07-14 RX ORDER — ROCURONIUM BROMIDE 10 MG/ML
INJECTION, SOLUTION INTRAVENOUS PRN
Status: DISCONTINUED | OUTPATIENT
Start: 2023-07-14 | End: 2023-07-14 | Stop reason: SDUPTHER

## 2023-07-14 RX ORDER — LABETALOL HYDROCHLORIDE 5 MG/ML
10 INJECTION, SOLUTION INTRAVENOUS
Status: DISCONTINUED | OUTPATIENT
Start: 2023-07-14 | End: 2023-07-14 | Stop reason: HOSPADM

## 2023-07-14 RX ORDER — OXYCODONE HYDROCHLORIDE AND ACETAMINOPHEN 5; 325 MG/1; MG/1
2 TABLET ORAL EVERY 4 HOURS PRN
Status: DISCONTINUED | OUTPATIENT
Start: 2023-07-14 | End: 2023-07-14 | Stop reason: HOSPADM

## 2023-07-14 RX ORDER — CLINDAMYCIN PHOSPHATE 900 MG/50ML
900 INJECTION INTRAVENOUS ONCE
Status: COMPLETED | OUTPATIENT
Start: 2023-07-14 | End: 2023-07-14

## 2023-07-14 RX ORDER — HYDROMORPHONE HYDROCHLORIDE 1 MG/ML
0.5 INJECTION, SOLUTION INTRAMUSCULAR; INTRAVENOUS; SUBCUTANEOUS EVERY 5 MIN PRN
Status: DISCONTINUED | OUTPATIENT
Start: 2023-07-14 | End: 2023-07-14 | Stop reason: HOSPADM

## 2023-07-14 RX ORDER — SODIUM CHLORIDE, SODIUM LACTATE, POTASSIUM CHLORIDE, CALCIUM CHLORIDE 600; 310; 30; 20 MG/100ML; MG/100ML; MG/100ML; MG/100ML
INJECTION, SOLUTION INTRAVENOUS ONCE
Status: DISCONTINUED | OUTPATIENT
Start: 2023-07-14 | End: 2023-07-14 | Stop reason: HOSPADM

## 2023-07-14 RX ORDER — MIDAZOLAM HYDROCHLORIDE 1 MG/ML
INJECTION INTRAMUSCULAR; INTRAVENOUS PRN
Status: DISCONTINUED | OUTPATIENT
Start: 2023-07-14 | End: 2023-07-14 | Stop reason: SDUPTHER

## 2023-07-14 RX ORDER — ONDANSETRON 2 MG/ML
INJECTION INTRAMUSCULAR; INTRAVENOUS PRN
Status: DISCONTINUED | OUTPATIENT
Start: 2023-07-14 | End: 2023-07-14 | Stop reason: SDUPTHER

## 2023-07-14 RX ORDER — ONDANSETRON 2 MG/ML
4 INJECTION INTRAMUSCULAR; INTRAVENOUS
Status: DISCONTINUED | OUTPATIENT
Start: 2023-07-14 | End: 2023-07-14 | Stop reason: HOSPADM

## 2023-07-14 RX ORDER — HYDRALAZINE HYDROCHLORIDE 20 MG/ML
10 INJECTION INTRAMUSCULAR; INTRAVENOUS
Status: DISCONTINUED | OUTPATIENT
Start: 2023-07-14 | End: 2023-07-14 | Stop reason: HOSPADM

## 2023-07-14 RX ORDER — SODIUM CHLORIDE, SODIUM LACTATE, POTASSIUM CHLORIDE, CALCIUM CHLORIDE 600; 310; 30; 20 MG/100ML; MG/100ML; MG/100ML; MG/100ML
INJECTION, SOLUTION INTRAVENOUS CONTINUOUS
Status: DISCONTINUED | OUTPATIENT
Start: 2023-07-14 | End: 2023-07-14 | Stop reason: HOSPADM

## 2023-07-14 RX ADMIN — CLINDAMYCIN IN 5 PERCENT DEXTROSE 900 MG: 18 INJECTION, SOLUTION INTRAVENOUS at 07:13

## 2023-07-14 RX ADMIN — SODIUM CHLORIDE, POTASSIUM CHLORIDE, SODIUM LACTATE AND CALCIUM CHLORIDE: 600; 310; 30; 20 INJECTION, SOLUTION INTRAVENOUS at 06:52

## 2023-07-14 RX ADMIN — SUGAMMADEX 200 MG: 100 INJECTION, SOLUTION INTRAVENOUS at 08:19

## 2023-07-14 RX ADMIN — PROPOFOL 200 MG: 10 INJECTION, EMULSION INTRAVENOUS at 07:42

## 2023-07-14 RX ADMIN — FENTANYL CITRATE 50 MCG: 50 INJECTION, SOLUTION INTRAMUSCULAR; INTRAVENOUS at 07:42

## 2023-07-14 RX ADMIN — PROPOFOL 50 MCG/KG/MIN: 10 INJECTION, EMULSION INTRAVENOUS at 07:50

## 2023-07-14 RX ADMIN — DEXAMETHASONE SODIUM PHOSPHATE 4 MG: 4 INJECTION, SOLUTION INTRA-ARTICULAR; INTRALESIONAL; INTRAMUSCULAR; INTRAVENOUS; SOFT TISSUE at 07:47

## 2023-07-14 RX ADMIN — SODIUM CHLORIDE, POTASSIUM CHLORIDE, SODIUM LACTATE AND CALCIUM CHLORIDE: 600; 310; 30; 20 INJECTION, SOLUTION INTRAVENOUS at 07:38

## 2023-07-14 RX ADMIN — ROCURONIUM BROMIDE 5 MG: 10 INJECTION, SOLUTION INTRAVENOUS at 07:42

## 2023-07-14 RX ADMIN — MIDAZOLAM HYDROCHLORIDE 2 MG: 2 INJECTION, SOLUTION INTRAMUSCULAR; INTRAVENOUS at 07:38

## 2023-07-14 RX ADMIN — LIDOCAINE HYDROCHLORIDE 60 MG: 20 INJECTION, SOLUTION EPIDURAL; INFILTRATION; INTRACAUDAL; PERINEURAL at 07:42

## 2023-07-14 RX ADMIN — ONDANSETRON 4 MG: 2 INJECTION INTRAMUSCULAR; INTRAVENOUS at 07:47

## 2023-07-14 RX ADMIN — Medication 120 MG: at 07:43

## 2023-07-14 RX ADMIN — FENTANYL CITRATE 25 MCG: 50 INJECTION, SOLUTION INTRAMUSCULAR; INTRAVENOUS at 07:55

## 2023-07-14 ASSESSMENT — PAIN SCALES - GENERAL
PAINLEVEL_OUTOF10: 0

## 2023-07-14 ASSESSMENT — ENCOUNTER SYMPTOMS: SHORTNESS OF BREATH: 1

## 2023-07-14 ASSESSMENT — LIFESTYLE VARIABLES: SMOKING_STATUS: 1

## 2023-07-14 ASSESSMENT — PAIN - FUNCTIONAL ASSESSMENT: PAIN_FUNCTIONAL_ASSESSMENT: 0-10

## 2023-07-14 ASSESSMENT — PAIN SCALES - WONG BAKER: WONGBAKER_NUMERICALRESPONSE: 0

## 2023-07-14 NOTE — ANESTHESIA POSTPROCEDURE EVALUATION
Department of Anesthesiology  Postprocedure Note    Patient: Analilia Kemp  MRN: [id number]  YOB: 1975  Date of evaluation: 7/14/2023      Procedure Summary     Date: 07/14/23 Room / Location: [facility] MAIN OR 04 / [facility] MAIN OR    Anesthesia Start: 6680 Anesthesia Stop: 7065    Procedure: EXCISION LEFT CHIN EPIDERMOID CYST (Left: Chin) Diagnosis:       Lipoma of face      (Lipoma of face [D17.0])    Surgeons: Iliana Murillo MD Responsible Provider: Asha Ko MD    Anesthesia Type: General ASA Status: 3          Anesthesia Type: General    Devyn Phase I: Devyn Score: 8    Devyn Phase II:        Anesthesia Post Evaluation    Patient location during evaluation: PACU  Patient participation: complete - patient participated  Level of consciousness: sleepy but conscious  Pain score: 0  Airway patency: patent  Nausea & Vomiting: no nausea and no vomiting  Complications: no  Cardiovascular status: hemodynamically stable  Respiratory status: acceptable  Hydration status: stable  Multimodal analgesia pain management approach

## 2023-07-14 NOTE — DISCHARGE INSTRUCTIONS
May get incision wet starting tomorrow  Do not shave at the incision site  Follow-up my office 2 weeks

## 2023-07-14 NOTE — PROGRESS NOTES
VSS,pt voided, iv cannula discontinued, pt and mother given discharge instructions, discharged out on a wheelchair with all personal belongings.

## 2023-07-14 NOTE — OP NOTE
Operative Note      Patient: Eduard Dias  YOB: 1975  MRN: [id number]    Date of Procedure: 7/14/2023    Pre-Op Diagnosis Codes:     * Lipoma of face [D17.0]    Post-Op Diagnosis: Epidermoid cyst of face    Procedure(s):  EXCISION LEFT CHIN EPIDERMOID CYST    Surgeon(s):  Jagdeep Hung MD    Assistant:   Surgical Assistant: Salvador Martinez    Anesthesia: General    Estimated Blood Loss (mL): Minimal    Complications: None    Specimens:   ID Type Source Tests Collected by Time Destination   A : Left Chin epidermoid cyst Tissue Tissue SURGICAL PATHOLOGY Jagdeep Hung MD 7/14/2023 0803        Implants:  * No implants in log *      Drains: * No LDAs found *    Findings: Approximately 1.5 cm epidermoid cyst left chin    This procedure was not performed to treat primary cutaneous melanoma through wide local excision      Detailed Description of Procedure:   Patient was brought to the operating room and positioned on the OR table in the supine position. Find induction of general anesthesia the left lower chin was shaved prepped and draped in standard sterile fashion. A surgical timeout was performed at which time the patient's identity and surgical procedure were once again confirmed. An approximately 2 cm incision was made overlying the soft tissue mass in the left chin. This preoperatively felt to be consistent with lipoma however the time of surgery was noted to be an epidermoid cyst.  The cyst was excised in its entirety using scissors and cautery. It was passed off the specimen. After extremities hemostasis the wound was closed in layers with 4-0 Vicryl deep dermal layers and a 4-0 Monocryl subcuticular suture. 0.5% Marcaine was then infiltrated the operative field and Dermabond dressing applied. At this point the procedure was terminated. The patient was awakened extubated and taken to recovery in stable condition.   All counts were correct at the close the

## 2023-07-14 NOTE — INTERVAL H&P NOTE
Update History & Physical    The patient's History and Physical of July 14, 2023 was reviewed with the patient and I examined the patient. There was no change. The surgical site was confirmed by the patient and me. Plan: The risks, benefits, expected outcome, and alternative to the recommended procedure have been discussed with the patient. Patient understands and wants to proceed with the procedure.      Electronically signed by Ping Laboy MD on 7/14/2023 at 7:22 AM

## 2024-10-01 ENCOUNTER — HOSPITAL ENCOUNTER (EMERGENCY)
Facility: HOSPITAL | Age: 49
Discharge: HOME | End: 2024-10-01
Attending: STUDENT IN AN ORGANIZED HEALTH CARE EDUCATION/TRAINING PROGRAM
Payer: MEDICARE

## 2024-10-01 VITALS
OXYGEN SATURATION: 95 % | TEMPERATURE: 97.5 F | SYSTOLIC BLOOD PRESSURE: 116 MMHG | DIASTOLIC BLOOD PRESSURE: 81 MMHG | RESPIRATION RATE: 16 BRPM | HEART RATE: 84 BPM

## 2024-10-01 DIAGNOSIS — K40.91 UNILATERAL RECURRENT INGUINAL HERNIA WITHOUT OBSTRUCTION OR GANGRENE: Primary | ICD-10-CM

## 2024-10-01 PROCEDURE — 99282 EMERGENCY DEPT VISIT SF MDM: CPT

## 2024-10-01 ASSESSMENT — PAIN SCALES - GENERAL: PAINLEVEL_OUTOF10: 9

## 2024-10-01 ASSESSMENT — LIFESTYLE VARIABLES
HOW OFTEN DO YOU HAVE A DRINK CONTAINING ALCOHOL: NEVER
HOW MANY STANDARD DRINKS CONTAINING ALCOHOL DO YOU HAVE ON A TYPICAL DAY: PATIENT DOES NOT DRINK

## 2024-10-01 ASSESSMENT — PAIN DESCRIPTION - LOCATION: LOCATION: GROIN

## 2024-10-01 NOTE — DISCHARGE INSTRUCTIONS
Thank you!    Thank you for allowing me to care for you in the emergency department.  I sincerely hope that you are satisfied with your visit today.  It is my goal to provide you with excellent care.    Below you will find a list of your labs and imaging from your visit today if applicable. Should you have any questions regarding these results please do not hesitate to call the emergency department. Please review [employer] for a more detailed result list since the below list may not be comprehensive. Instructions on how to sign up to [employer] should be provided in this packet.    Labs -   No results found for this or any previous visit (from the past 12 hour(s)).    Radiologic Studies -   No orders to display     [unfilled]  [unfilled]       If you feel that you have not received excellent quality care or timely care, please ask to speak to the nurse manager. Please choose us in the future for your continued health care needs.   ------------------------------------------------------------------------------------------------------------  The exam and treatment you received in the Emergency Department were for an urgent problem and are not intended as complete care. It is very important that you follow-up with a doctor, nurse practitioner, or physician assistant in a timely manner to:  (1) confirm your diagnosis and review all imaging and lab results,  (2) re-evaluation of changes in your illness and treatment, and  (3) for ongoing care.  If your symptoms become worse or you do not improve as expected and you are unable to reach your usual health care provider, you should return to the Emergency Department. We are available 24 hours a day.     Please take your discharge instructions with you when you go to your follow-up appointment.     If a prescription has been provided, please have it filled as soon as possible to prevent a delay in treatment. Read the entire medication instruction sheet provided to you by the pharmacy. If

## 2024-10-02 NOTE — ED PROVIDER NOTES
lipids. Please make an appointment foradditional refills.      clonazePAM (KLONOPIN) 1 MG tablet Take by mouth 3 times daily as needed.      diclofenac sodium (VOLTAREN) 1 % GEL Apply 2 g topically 4 times daily (Patient not taking: Reported on 6/27/2023)      divalproex (DEPAKOTE) 250 MG DR tablet Take 1 tablet by mouth daily (Patient not taking: Reported on 6/27/2023)      divalproex (DEPAKOTE) 500 MG DR tablet Take 1 tablet by mouth 2 times daily (Patient not taking: Reported on 6/27/2023)      gabapentin (NEURONTIN) 800 MG tablet Take by mouth 3 times daily. (Patient not taking: Reported on 6/27/2023)      haloperidol (HALDOL) 5 MG tablet Take 1 tablet by mouth 2 times daily (Patient not taking: Reported on 6/8/2023)      ibuprofen (ADVIL;MOTRIN) 600 MG tablet Take 1 tablet by mouth every 6 hours as needed (Patient not taking: Reported on 6/27/2023)      naloxegol (MOVANTIK) 12.5 MG TABS tablet Take 1 tablet by mouth daily      oxyCODONE-acetaminophen (PERCOCET) 7.5-325 MG per tablet Take 1 tablet by mouth 2 times daily as needed. Max Daily Amount: 2 tablets (Patient not taking: Reported on 6/8/2023)      QUEtiapine (SEROQUEL XR) 300 MG extended release tablet Take 1 tablet by mouth nightly      traMADol (ULTRAM) 50 MG tablet Take 1 tablet by mouth every 8 hours as needed. Max Daily Amount: 150 mg (Patient not taking: Reported on 6/8/2023)      traZODone (DESYREL) 100 MG tablet Take 2 tablets by mouth nightly (Patient not taking: Reported on 6/8/2023)         Social Determinants of Health:   Social Determinants of Health     Tobacco Use: High Risk (9/11/2024)    Received from [facility]    Patient History     Smoking Tobacco Use: Every Day     Smokeless Tobacco Use: Never     Passive Exposure: Not on file   Alcohol Use: Not At Risk (10/1/2024)    AUDIT-C     Frequency of Alcohol Consumption: Never     Average Number of Drinks: Patient does not drink     Frequency of Binge Drinking: Never   Financial Resource

## 2024-12-19 ENCOUNTER — HOSPITAL ENCOUNTER (EMERGENCY)
Facility: HOSPITAL | Age: 49
Discharge: HOME | End: 2024-12-19
Attending: STUDENT IN AN ORGANIZED HEALTH CARE EDUCATION/TRAINING PROGRAM
Payer: MEDICARE

## 2024-12-19 ENCOUNTER — APPOINTMENT (OUTPATIENT)
Facility: HOSPITAL | Age: 49
End: 2024-12-19
Payer: MEDICARE

## 2024-12-19 VITALS
OXYGEN SATURATION: 97 % | HEART RATE: 92 BPM | BODY MASS INDEX: 18.75 KG/M2 | WEIGHT: 131 LBS | HEIGHT: 70 IN | TEMPERATURE: 98.3 F | RESPIRATION RATE: 18 BRPM | DIASTOLIC BLOOD PRESSURE: 65 MMHG | SYSTOLIC BLOOD PRESSURE: 131 MMHG

## 2024-12-19 DIAGNOSIS — R18.8 INGUINAL FLUID COLLECTION: Primary | ICD-10-CM

## 2024-12-19 LAB
ANION GAP SERPL CALC-SCNC: 3 MMOL/L (ref 2–12)
APPEARANCE UR: CLEAR
BACTERIA URNS QL MICRO: NEGATIVE /HPF
BASOPHILS # BLD: 0 K/UL (ref 0–0.1)
BASOPHILS NFR BLD: 0 % (ref 0–1)
BILIRUB UR QL: NEGATIVE
BUN SERPL-MCNC: 14 MG/DL (ref 6–20)
BUN/CREAT SERPL: 12 (ref 12–20)
CA-I BLD-MCNC: 8.8 MG/DL (ref 8.5–10.1)
CHLORIDE SERPL-SCNC: 110 MMOL/L (ref 97–108)
CO2 SERPL-SCNC: 27 MMOL/L (ref 21–32)
COLOR UR: NORMAL
CREAT SERPL-MCNC: 1.18 MG/DL (ref 0.7–1.3)
DIFFERENTIAL METHOD BLD: ABNORMAL
EOSINOPHIL # BLD: 0 K/UL (ref 0–0.4)
EOSINOPHIL NFR BLD: 0 % (ref 0–7)
EPITH CASTS URNS QL MICRO: NORMAL /LPF
ERYTHROCYTE [DISTWIDTH] IN BLOOD BY AUTOMATED COUNT: 12.7 % (ref 11.5–14.5)
GLUCOSE SERPL-MCNC: 144 MG/DL (ref 65–100)
GLUCOSE UR STRIP.AUTO-MCNC: NEGATIVE MG/DL
HCT VFR BLD AUTO: 39.3 % (ref 36.6–50.3)
HGB BLD-MCNC: 13.1 G/DL (ref 12.1–17)
HGB UR QL STRIP: NEGATIVE
IMM GRANULOCYTES # BLD AUTO: 0 K/UL (ref 0–0.04)
IMM GRANULOCYTES NFR BLD AUTO: 0 % (ref 0–0.5)
KETONES UR QL STRIP.AUTO: NEGATIVE MG/DL
LACTATE BLD-SCNC: 1.73 MMOL/L (ref 0.4–2)
LEUKOCYTE ESTERASE UR QL STRIP.AUTO: NEGATIVE
LYMPHOCYTES # BLD: 0.6 K/UL (ref 0.8–3.5)
LYMPHOCYTES NFR BLD: 7 % (ref 12–49)
MCH RBC QN AUTO: 30.6 PG (ref 26–34)
MCHC RBC AUTO-ENTMCNC: 33.3 G/DL (ref 30–36.5)
MCV RBC AUTO: 91.8 FL (ref 80–99)
MONOCYTES # BLD: 0.1 K/UL (ref 0–1)
MONOCYTES NFR BLD: 1 % (ref 5–13)
NEUTS SEG # BLD: 8.1 K/UL (ref 1.8–8)
NEUTS SEG NFR BLD: 92 % (ref 32–75)
NITRITE UR QL STRIP.AUTO: NEGATIVE
NRBC # BLD: 0 K/UL (ref 0–0.01)
NRBC BLD-RTO: 0 PER 100 WBC
PERFORMED BY:: NORMAL
PH UR STRIP: 6 (ref 5–8)
PLATELET # BLD AUTO: 235 K/UL (ref 150–400)
PMV BLD AUTO: 8.9 FL (ref 8.9–12.9)
POTASSIUM SERPL-SCNC: ABNORMAL MMOL/L (ref 3.5–5.1)
PROT UR STRIP-MCNC: NEGATIVE MG/DL
RBC # BLD AUTO: 4.28 M/UL (ref 4.1–5.7)
RBC #/AREA URNS HPF: NORMAL /HPF (ref 0–5)
SODIUM SERPL-SCNC: 140 MMOL/L (ref 136–145)
SP GR UR REFRACTOMETRY: 1.01 (ref 1–1.03)
UROBILINOGEN UR QL STRIP.AUTO: 0.1 EU/DL (ref 0.1–1)
WBC # BLD AUTO: 8.8 K/UL (ref 4.1–11.1)
WBC URNS QL MICRO: NORMAL /HPF (ref 0–4)

## 2024-12-19 PROCEDURE — 36415 COLL VENOUS BLD VENIPUNCTURE: CPT

## 2024-12-19 PROCEDURE — 93005 ELECTROCARDIOGRAM TRACING: CPT | Performed by: STUDENT IN AN ORGANIZED HEALTH CARE EDUCATION/TRAINING PROGRAM

## 2024-12-19 PROCEDURE — 99285 EMERGENCY DEPT VISIT HI MDM: CPT

## 2024-12-19 PROCEDURE — 85025 COMPLETE CBC W/AUTO DIFF WBC: CPT

## 2024-12-19 PROCEDURE — 80048 BASIC METABOLIC PNL TOTAL CA: CPT

## 2024-12-19 PROCEDURE — 81001 URINALYSIS AUTO W/SCOPE: CPT

## 2024-12-19 PROCEDURE — 96374 THER/PROPH/DIAG INJ IV PUSH: CPT

## 2024-12-19 PROCEDURE — 74177 CT ABD & PELVIS W/CONTRAST: CPT

## 2024-12-19 PROCEDURE — 6360000002 HC RX W HCPCS: Performed by: STUDENT IN AN ORGANIZED HEALTH CARE EDUCATION/TRAINING PROGRAM

## 2024-12-19 PROCEDURE — 83605 ASSAY OF LACTIC ACID: CPT

## 2024-12-19 PROCEDURE — 6360000004 HC RX CONTRAST MEDICATION: Performed by: STUDENT IN AN ORGANIZED HEALTH CARE EDUCATION/TRAINING PROGRAM

## 2024-12-19 RX ORDER — MORPHINE SULFATE 4 MG/ML
4 INJECTION, SOLUTION INTRAMUSCULAR; INTRAVENOUS
Status: DISCONTINUED | OUTPATIENT
Start: 2024-12-19 | End: 2024-12-19 | Stop reason: HOSPADM

## 2024-12-19 RX ORDER — KETOROLAC TROMETHAMINE 10 MG/1
10 TABLET, FILM COATED ORAL EVERY 6 HOURS PRN
Qty: 20 TABLET | Refills: 0 | Status: SHIPPED | OUTPATIENT
Start: 2024-12-19

## 2024-12-19 RX ORDER — TAMSULOSIN HYDROCHLORIDE 0.4 MG/1
0.4 CAPSULE ORAL DAILY
Qty: 30 CAPSULE | Refills: 0 | Status: SHIPPED | OUTPATIENT
Start: 2024-12-19

## 2024-12-19 RX ORDER — FENTANYL CITRATE 50 UG/ML
25 INJECTION, SOLUTION INTRAMUSCULAR; INTRAVENOUS
Status: COMPLETED | OUTPATIENT
Start: 2024-12-19 | End: 2024-12-19

## 2024-12-19 RX ORDER — IOPAMIDOL 755 MG/ML
100 INJECTION, SOLUTION INTRAVASCULAR
Status: COMPLETED | OUTPATIENT
Start: 2024-12-19 | End: 2024-12-19

## 2024-12-19 RX ADMIN — FENTANYL CITRATE 25 MCG: 50 INJECTION, SOLUTION INTRAMUSCULAR; INTRAVENOUS at 15:26

## 2024-12-19 RX ADMIN — IOPAMIDOL 100 ML: 755 INJECTION, SOLUTION INTRAVENOUS at 14:46

## 2024-12-19 ASSESSMENT — PAIN - FUNCTIONAL ASSESSMENT: PAIN_FUNCTIONAL_ASSESSMENT: 0-10

## 2024-12-19 ASSESSMENT — PAIN SCALES - GENERAL
PAINLEVEL_OUTOF10: 10

## 2024-12-19 NOTE — ED TRIAGE NOTES
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 PT notified provider overseeing this situation and was told to come  in due to suspected rupture     Chest pain this morning, took 3 doses of nitro and pain resolved.

## 2024-12-19 NOTE — ED PROVIDER NOTES
Medications        These medications were sent to [id number] - [place], [place] - [address] - P [phone] - F [phone] [address] [facility] [address]      Phone: [phone]   tamsulosin 0.4 MG capsule           DISCONTINUED MEDICATIONS:  Current Discharge Medication List          I am the Primary Clinician of Record: Jean Naranjo MD (electronically signed)    (Please note that parts of this dictation were completed with voice recognition software. Quite often unanticipated grammatical, syntax, homophones, and other interpretive errors are inadvertently transcribed by the computer software. Please disregards these errors. Please excuse any errors that have escaped final proofreading.)     Jean Naranjo MD  12/20/24 0950

## 2024-12-19 NOTE — ED NOTES
Discharge instructions reviewed with pt and family and both indicated understanding. Pt ambulated out with family, all belongings, and a steady gait.

## 2024-12-21 LAB
EKG ATRIAL RATE: 103 BPM
EKG DIAGNOSIS: NORMAL
EKG P AXIS: 71 DEGREES
EKG P-R INTERVAL: 126 MS
EKG Q-T INTERVAL: 326 MS
EKG QRS DURATION: 82 MS
EKG QTC CALCULATION (BAZETT): 427 MS
EKG R AXIS: 66 DEGREES
EKG T AXIS: 68 DEGREES
EKG VENTRICULAR RATE: 103 BPM

## 2025-03-27 ENCOUNTER — APPOINTMENT (OUTPATIENT)
Facility: HOSPITAL | Age: 50
End: 2025-03-27
Payer: MEDICARE

## 2025-03-27 ENCOUNTER — HOSPITAL ENCOUNTER (EMERGENCY)
Facility: HOSPITAL | Age: 50
Discharge: LEFT BEFORE DISCHARGE | End: 2025-03-27
Attending: EMERGENCY MEDICINE
Payer: MEDICARE

## 2025-03-27 ENCOUNTER — APPOINTMENT (OUTPATIENT)
Facility: HOSPITAL | Age: 50
End: 2025-03-27
Attending: EMERGENCY MEDICINE
Payer: MEDICARE

## 2025-03-27 VITALS
HEART RATE: 94 BPM | TEMPERATURE: 98.4 F | OXYGEN SATURATION: 99 % | WEIGHT: 125 LBS | BODY MASS INDEX: 17.9 KG/M2 | HEIGHT: 70 IN | SYSTOLIC BLOOD PRESSURE: 122 MMHG | DIASTOLIC BLOOD PRESSURE: 73 MMHG | RESPIRATION RATE: 18 BRPM

## 2025-03-27 DIAGNOSIS — K40.90 NON-RECURRENT UNILATERAL INGUINAL HERNIA WITHOUT OBSTRUCTION OR GANGRENE: ICD-10-CM

## 2025-03-27 DIAGNOSIS — M87.051 AVASCULAR NECROSIS OF BONE OF HIP, RIGHT (HCC): Primary | ICD-10-CM

## 2025-03-27 DIAGNOSIS — M84.453A: ICD-10-CM

## 2025-03-27 LAB
ALBUMIN SERPL-MCNC: 3.5 G/DL (ref 3.5–5)
ALBUMIN/GLOB SERPL: 1.2 (ref 1.1–2.2)
ALP SERPL-CCNC: 60 U/L (ref 45–117)
ALT SERPL-CCNC: 23 U/L (ref 12–78)
ANION GAP SERPL CALC-SCNC: 4 MMOL/L (ref 2–12)
AST SERPL W P-5'-P-CCNC: 17 U/L (ref 15–37)
BASOPHILS # BLD: 0.03 K/UL (ref 0–0.1)
BASOPHILS NFR BLD: 0.4 % (ref 0–1)
BILIRUB SERPL-MCNC: 0.2 MG/DL (ref 0.2–1)
BUN SERPL-MCNC: 7 MG/DL (ref 6–20)
BUN/CREAT SERPL: 9 (ref 12–20)
CA-I BLD-MCNC: 8.6 MG/DL (ref 8.5–10.1)
CHLORIDE SERPL-SCNC: 104 MMOL/L (ref 97–108)
CO2 SERPL-SCNC: 25 MMOL/L (ref 21–32)
CREAT SERPL-MCNC: 0.8 MG/DL (ref 0.7–1.3)
DIFFERENTIAL METHOD BLD: NORMAL
EOSINOPHIL # BLD: 0.04 K/UL (ref 0–0.4)
EOSINOPHIL NFR BLD: 0.6 % (ref 0–7)
ERYTHROCYTE [DISTWIDTH] IN BLOOD BY AUTOMATED COUNT: 13.6 % (ref 11.5–14.5)
GLOBULIN SER CALC-MCNC: 3 G/DL (ref 2–4)
GLUCOSE SERPL-MCNC: 115 MG/DL (ref 65–100)
HCT VFR BLD AUTO: 36.6 % (ref 36.6–50.3)
HGB BLD-MCNC: 12.1 G/DL (ref 12.1–17)
IMM GRANULOCYTES # BLD AUTO: 0.02 K/UL (ref 0–0.04)
IMM GRANULOCYTES NFR BLD AUTO: 0.3 % (ref 0–0.5)
LYMPHOCYTES # BLD: 1.17 K/UL (ref 0.8–3.5)
LYMPHOCYTES NFR BLD: 17.3 % (ref 12–49)
MCH RBC QN AUTO: 29.5 PG (ref 26–34)
MCHC RBC AUTO-ENTMCNC: 33.1 G/DL (ref 30–36.5)
MCV RBC AUTO: 89.3 FL (ref 80–99)
MONOCYTES # BLD: 0.54 K/UL (ref 0–1)
MONOCYTES NFR BLD: 8 % (ref 5–13)
NEUTS SEG # BLD: 4.96 K/UL (ref 1.8–8)
NEUTS SEG NFR BLD: 73.4 % (ref 32–75)
NRBC # BLD: 0 K/UL (ref 0–0.01)
NRBC BLD-RTO: 0 PER 100 WBC
PLATELET # BLD AUTO: 258 K/UL (ref 150–400)
PMV BLD AUTO: 9 FL (ref 8.9–12.9)
POTASSIUM SERPL-SCNC: 3.7 MMOL/L (ref 3.5–5.1)
PROT SERPL-MCNC: 6.5 G/DL (ref 6.4–8.2)
RBC # BLD AUTO: 4.1 M/UL (ref 4.1–5.7)
SODIUM SERPL-SCNC: 133 MMOL/L (ref 136–145)
WBC # BLD AUTO: 6.8 K/UL (ref 4.1–11.1)

## 2025-03-27 PROCEDURE — 80053 COMPREHEN METABOLIC PANEL: CPT

## 2025-03-27 PROCEDURE — 85025 COMPLETE CBC W/AUTO DIFF WBC: CPT

## 2025-03-27 PROCEDURE — 99284 EMERGENCY DEPT VISIT MOD MDM: CPT

## 2025-03-27 PROCEDURE — 36415 COLL VENOUS BLD VENIPUNCTURE: CPT

## 2025-03-27 PROCEDURE — 76870 US EXAM SCROTUM: CPT

## 2025-03-27 PROCEDURE — 73502 X-RAY EXAM HIP UNI 2-3 VIEWS: CPT

## 2025-03-27 RX ORDER — IBUPROFEN 600 MG/1
600 TABLET, FILM COATED ORAL
Status: DISCONTINUED | OUTPATIENT
Start: 2025-03-27 | End: 2025-03-27 | Stop reason: HOSPADM

## 2025-03-27 ASSESSMENT — LIFESTYLE VARIABLES
HOW MANY STANDARD DRINKS CONTAINING ALCOHOL DO YOU HAVE ON A TYPICAL DAY: PATIENT DECLINED
HOW OFTEN DO YOU HAVE A DRINK CONTAINING ALCOHOL: PATIENT DECLINED

## 2025-03-27 ASSESSMENT — PAIN - FUNCTIONAL ASSESSMENT: PAIN_FUNCTIONAL_ASSESSMENT: 0-10

## 2025-03-27 ASSESSMENT — PAIN SCALES - GENERAL: PAINLEVEL_OUTOF10: 8

## 2025-03-27 NOTE — ED PROVIDER NOTES
as needed.      diclofenac sodium (VOLTAREN) 1 % GEL Apply 2 g topically 4 times daily (Patient not taking: Reported on 6/27/2023)      divalproex (DEPAKOTE) 250 MG DR tablet Take 1 tablet by mouth daily (Patient not taking: Reported on 6/27/2023)      divalproex (DEPAKOTE) 500 MG DR tablet Take 1 tablet by mouth 2 times daily (Patient not taking: Reported on 6/27/2023)      gabapentin (NEURONTIN) 800 MG tablet Take by mouth 3 times daily. (Patient not taking: Reported on 6/27/2023)      haloperidol (HALDOL) 5 MG tablet Take 1 tablet by mouth 2 times daily (Patient not taking: Reported on 6/8/2023)      ibuprofen (ADVIL;MOTRIN) 600 MG tablet Take 1 tablet by mouth every 6 hours as needed (Patient not taking: Reported on 6/27/2023)      naloxegol (MOVANTIK) 12.5 MG TABS tablet Take 1 tablet by mouth daily      oxyCODONE-acetaminophen (PERCOCET) 7.5-325 MG per tablet Take 1 tablet by mouth 2 times daily as needed. Max Daily Amount: 2 tablets (Patient not taking: Reported on 6/8/2023)      QUEtiapine (SEROQUEL XR) 300 MG extended release tablet Take 1 tablet by mouth nightly      traMADol (ULTRAM) 50 MG tablet Take 1 tablet by mouth every 8 hours as needed. Max Daily Amount: 150 mg (Patient not taking: Reported on 6/8/2023)      traZODone (DESYREL) 100 MG tablet Take 2 tablets by mouth nightly (Patient not taking: Reported on 6/8/2023)       Social Determinants of Health:   Social Drivers of Health     Tobacco Use: High Risk (11/4/2024)    Received from [facility]    Patient History     Smoking Tobacco Use: Every Day     Smokeless Tobacco Use: Never     Passive Exposure: Not on file   Alcohol Use: Patient Declined (3/27/2025)    AUDIT-C     Frequency of Alcohol Consumption: Patient declined     Average Number of Drinks: Patient declined     Frequency of Binge Drinking: Patient declined   Financial Resource Strain: Not on file   Food Insecurity: Not on file   Transportation Needs: Not on file   Physical Activity: Not on

## 2025-03-27 NOTE — ED NOTES
Ultrasound tech reports that pt did not complete the study due to grabbing her wrist and making inappropriate comments.

## 2025-03-27 NOTE — ED TRIAGE NOTES
C/o tumors in groin, right groin pain, and right hip pain. Pt talking about blood coming from finger nails and phone being hacked in triage, asking staff if we see a hole on phone, not present at this time.

## 2025-03-27 NOTE — ED NOTES
Pt declines further treatment. PIV removed by TRENT Hunt. MD Jaswinder informed. Pt observed walking out with ex-wife at side.

## 2025-05-01 ENCOUNTER — HOSPITAL ENCOUNTER (EMERGENCY)
Facility: HOSPITAL | Age: 50
Discharge: HOME | End: 2025-05-01
Attending: EMERGENCY MEDICINE
Payer: MEDICARE

## 2025-05-01 ENCOUNTER — APPOINTMENT (OUTPATIENT)
Facility: HOSPITAL | Age: 50
End: 2025-05-01
Payer: MEDICARE

## 2025-05-01 VITALS
OXYGEN SATURATION: 100 % | WEIGHT: 145 LBS | HEIGHT: 70 IN | SYSTOLIC BLOOD PRESSURE: 106 MMHG | DIASTOLIC BLOOD PRESSURE: 59 MMHG | HEART RATE: 71 BPM | RESPIRATION RATE: 15 BRPM | TEMPERATURE: 98.1 F | BODY MASS INDEX: 20.76 KG/M2

## 2025-05-01 DIAGNOSIS — I95.9 HYPOTENSION, UNSPECIFIED HYPOTENSION TYPE: Primary | ICD-10-CM

## 2025-05-01 DIAGNOSIS — Z79.899 POLYPHARMACY: ICD-10-CM

## 2025-05-01 LAB
ALBUMIN SERPL-MCNC: 2.8 G/DL (ref 3.5–5)
ALBUMIN/GLOB SERPL: 1.2 (ref 1.1–2.2)
ALP SERPL-CCNC: 67 U/L (ref 45–117)
ALT SERPL-CCNC: 21 U/L (ref 12–78)
ANION GAP SERPL CALC-SCNC: 4 MMOL/L (ref 2–12)
AST SERPL W P-5'-P-CCNC: 14 U/L (ref 15–37)
BASOPHILS # BLD: 0.03 K/UL (ref 0–0.1)
BASOPHILS NFR BLD: 0.5 % (ref 0–1)
BILIRUB SERPL-MCNC: 0.3 MG/DL (ref 0.2–1)
BUN SERPL-MCNC: 11 MG/DL (ref 6–20)
BUN/CREAT SERPL: 14 (ref 12–20)
CA-I BLD-MCNC: 8.2 MG/DL (ref 8.5–10.1)
CHLORIDE SERPL-SCNC: 107 MMOL/L (ref 97–108)
CO2 SERPL-SCNC: 28 MMOL/L (ref 21–32)
CREAT SERPL-MCNC: 0.76 MG/DL (ref 0.7–1.3)
DIFFERENTIAL METHOD BLD: ABNORMAL
EOSINOPHIL # BLD: 0.09 K/UL (ref 0–0.4)
EOSINOPHIL NFR BLD: 1.4 % (ref 0–7)
ERYTHROCYTE [DISTWIDTH] IN BLOOD BY AUTOMATED COUNT: 14.5 % (ref 11.5–14.5)
ETHANOL SERPL-MCNC: <10 MG/DL (ref 0–0.08)
GLOBULIN SER CALC-MCNC: 2.4 G/DL (ref 2–4)
GLUCOSE BLD STRIP.AUTO-MCNC: 121 MG/DL (ref 65–100)
GLUCOSE SERPL-MCNC: 108 MG/DL (ref 65–100)
HCT VFR BLD AUTO: 35.5 % (ref 36.6–50.3)
HGB BLD-MCNC: 11.4 G/DL (ref 12.1–17)
IMM GRANULOCYTES # BLD AUTO: 0.01 K/UL (ref 0–0.04)
IMM GRANULOCYTES NFR BLD AUTO: 0.2 % (ref 0–0.5)
LYMPHOCYTES # BLD: 1.8 K/UL (ref 0.8–3.5)
LYMPHOCYTES NFR BLD: 27.9 % (ref 12–49)
MAGNESIUM SERPL-MCNC: 1.8 MG/DL (ref 1.6–2.4)
MCH RBC QN AUTO: 29.2 PG (ref 26–34)
MCHC RBC AUTO-ENTMCNC: 32.1 G/DL (ref 30–36.5)
MCV RBC AUTO: 90.8 FL (ref 80–99)
MONOCYTES # BLD: 0.47 K/UL (ref 0–1)
MONOCYTES NFR BLD: 7.3 % (ref 5–13)
NEUTS SEG # BLD: 4.06 K/UL (ref 1.8–8)
NEUTS SEG NFR BLD: 62.7 % (ref 32–75)
NRBC # BLD: 0 K/UL (ref 0–0.01)
NRBC BLD-RTO: 0 PER 100 WBC
PERFORMED BY:: ABNORMAL
PLATELET # BLD AUTO: 200 K/UL (ref 150–400)
PMV BLD AUTO: 9.4 FL (ref 8.9–12.9)
POTASSIUM SERPL-SCNC: 3.7 MMOL/L (ref 3.5–5.1)
PROT SERPL-MCNC: 5.2 G/DL (ref 6.4–8.2)
RBC # BLD AUTO: 3.91 M/UL (ref 4.1–5.7)
SODIUM SERPL-SCNC: 139 MMOL/L (ref 136–145)
VALPROATE SERPL-MCNC: <3 UG/ML (ref 50–100)
WBC # BLD AUTO: 6.5 K/UL (ref 4.1–11.1)

## 2025-05-01 PROCEDURE — 93005 ELECTROCARDIOGRAM TRACING: CPT | Performed by: EMERGENCY MEDICINE

## 2025-05-01 PROCEDURE — 80164 ASSAY DIPROPYLACETIC ACD TOT: CPT

## 2025-05-01 PROCEDURE — 82077 ASSAY SPEC XCP UR&BREATH IA: CPT

## 2025-05-01 PROCEDURE — 2580000003 HC RX 258: Performed by: EMERGENCY MEDICINE

## 2025-05-01 PROCEDURE — 96360 HYDRATION IV INFUSION INIT: CPT

## 2025-05-01 PROCEDURE — 82962 GLUCOSE BLOOD TEST: CPT

## 2025-05-01 PROCEDURE — 96361 HYDRATE IV INFUSION ADD-ON: CPT

## 2025-05-01 PROCEDURE — 83735 ASSAY OF MAGNESIUM: CPT

## 2025-05-01 PROCEDURE — 70450 CT HEAD/BRAIN W/O DYE: CPT

## 2025-05-01 PROCEDURE — 85025 COMPLETE CBC W/AUTO DIFF WBC: CPT

## 2025-05-01 PROCEDURE — 36415 COLL VENOUS BLD VENIPUNCTURE: CPT

## 2025-05-01 PROCEDURE — 80053 COMPREHEN METABOLIC PANEL: CPT

## 2025-05-01 PROCEDURE — 99284 EMERGENCY DEPT VISIT MOD MDM: CPT

## 2025-05-01 RX ORDER — 0.9 % SODIUM CHLORIDE 0.9 %
1000 INTRAVENOUS SOLUTION INTRAVENOUS
Status: COMPLETED | OUTPATIENT
Start: 2025-05-01 | End: 2025-05-01

## 2025-05-01 RX ADMIN — SODIUM CHLORIDE 1000 ML: 0.9 INJECTION, SOLUTION INTRAVENOUS at 14:44

## 2025-05-01 ASSESSMENT — LIFESTYLE VARIABLES
HOW MANY STANDARD DRINKS CONTAINING ALCOHOL DO YOU HAVE ON A TYPICAL DAY: 1 OR 2
HOW OFTEN DO YOU HAVE A DRINK CONTAINING ALCOHOL: MONTHLY OR LESS

## 2025-05-01 ASSESSMENT — PAIN - FUNCTIONAL ASSESSMENT: PAIN_FUNCTIONAL_ASSESSMENT: NONE - DENIES PAIN

## 2025-05-01 NOTE — DISCHARGE INSTRUCTIONS
Anion Gap 4 2 - 12 mmol/L    Glucose 108 (H) 65 - 100 mg/dL    BUN 11 6 - 20 mg/dL    Creatinine 0.76 0.70 - 1.30 mg/dL    BUN/Creatinine Ratio 14 12 - 20      Est, Glom Filt Rate >90 >60 ml/min/1.73m2    Calcium 8.2 (L) 8.5 - 10.1 mg/dL    Total Bilirubin 0.3 0.2 - 1.0 mg/dL    AST 14 (L) 15 - 37 U/L    ALT 21 12 - 78 U/L    Alk Phosphatase 67 45 - 117 U/L    Total Protein 5.2 (L) 6.4 - 8.2 g/dL    Albumin 2.8 (L) 3.5 - 5.0 g/dL    Globulin 2.4 2.0 - 4.0 g/dL    Albumin/Globulin Ratio 1.2 1.1 - 2.2     Magnesium    Collection Time: 05/01/25  2:43 PM   Result Value Ref Range    Magnesium 1.8 1.6 - 2.4 mg/dL   Ethanol    Collection Time: 05/01/25  2:43 PM   Result Value Ref Range    Ethanol Lvl <10 <10 mg/dL   Valproic Acid Level, Total    Collection Time: 05/01/25  2:43 PM   Result Value Ref Range    Valproic Acid <3 (L) 50 - 100 ug/mL     CT HEAD WO CONTRAST  Result Date: 5/1/2025  EXAM: CT HEAD WO CONTRAST INDICATION: Acute mental status change COMPARISON: None. CONTRAST: None. TECHNIQUE: Unenhanced CT of the head was performed using 5 mm images. Brain and bone windows were generated. Coronal and sagittal reformats. CT dose reduction was achieved through use of a standardized protocol tailored for this examination and automatic exposure control for dose modulation.  FINDINGS: The ventricles and sulci are normal in size, shape and configuration. There is no significant white matter disease. There is no intracranial hemorrhage, extra-axial collection, or mass effect. The basilar cisterns are open. No CT evidence of acute infarct. The bone windows demonstrate no abnormalities. The visualized portions of the paranasal sinuses and mastoid air cells are clear.     No acute process identified Electronically signed by Priscila Antonio    ------------------------------------------------------------------------------------------------------------  The evaluation and treatment you received in the Emergency Department were for

## 2025-05-01 NOTE — ED PROVIDER NOTES
[facility] EMERGENCY DEPT  EMERGENCY DEPARTMENT HISTORY AND PHYSICAL EXAM      Date of evaluation: 5/1/2025  Patient Name: Jayme Cagle  Birthdate 1975  MRN: [id number]  ED Provider: Jayme Francisco MD   Note Started: 1:36 PM EDT 5/1/25    HISTORY OF PRESENT ILLNESS     Chief Complaint   Patient presents with    Illness       History Provided By: Patient, only     HPI: Jayme Cagle is a 49 y.o. male presents with being sent over from Suboxone clinic to get his oxygen checked out.  His oxygen is normal as blood pressure is low.  Patient slurring speech.  He denies any alcohol use.  He denies any recreational opioid use or current narcotic prescriptions other than Suboxone.  He does however take multiple sedating medications such as Seroquel Topamax, may be Depakote.  States he has his chronic back pain unchanged.    PAST MEDICAL HISTORY   Past Medical History:  Past Medical History:   Diagnosis Date    Anxiety     Anxiety     Arthritis     Bipolar 2 disorder (HCC)     Blurred vision     Constipation     Coughing     Diarrhea     Factor 5 Leiden mutation, heterozygous     sees PCP - Delacruz    High cholesterol     History of back surgery     3 failed surgeries    Joint pain     all over    Joint swelling     Manic depression (HCC)     Multiple stiff joints     Muscle ache     Muscle pain     Muscle weakness     PONV (postoperative nausea and vomiting)     Prolonged emergence from general anesthesia     Schizophrenia (HCC)     Seizures (HCC)     Sinus complaint     Sleeping difficulty     SOB (shortness of breath)     Stress     TBI (traumatic brain injury) ([facility])     Trigeminal neuralgia        Past Surgical History:  Past Surgical History:   Procedure Laterality Date    BACK SURGERY      3 failed    CYST REMOVAL      tail bone as young adult    NECK SURGERY Left 7/14/2023    EXCISION LEFT CHIN EPIDERMOID CYST performed by Kacie Arnold MD at [facility] MAIN OR       Family History:  Family History

## 2025-05-01 NOTE — ED TRIAGE NOTES
Patient says he was at the doctor getting his suboxone and was told his o2 was low. Patient has no complaints. Patient falls asleep easily during conversation

## 2025-05-01 NOTE — PROGRESS NOTES
Patient is difficult stick. Has had multiple attempts. Will try ultrasound when patient returns from [place]

## 2025-05-02 LAB
EKG ATRIAL RATE: 68 BPM
EKG DIAGNOSIS: NORMAL
EKG P AXIS: 64 DEGREES
EKG P-R INTERVAL: 156 MS
EKG Q-T INTERVAL: 404 MS
EKG QRS DURATION: 92 MS
EKG QTC CALCULATION (BAZETT): 429 MS
EKG R AXIS: 76 DEGREES
EKG T AXIS: 81 DEGREES
EKG VENTRICULAR RATE: 68 BPM

## 2025-05-02 PROCEDURE — 93010 ELECTROCARDIOGRAM REPORT: CPT | Performed by: SPECIALIST

## (undated) DEVICE — SUTURE VCRL + SZ 3-0 L27IN ABSRB UD L26MM SH 1/2 CIR VCP416H

## (undated) DEVICE — PREP PAD BNS: Brand: CONVERTORS

## (undated) DEVICE — WET SKIN PREP TRAY: Brand: MEDLINE INDUSTRIES, INC.

## (undated) DEVICE — GARMENT,MEDLINE,DVT,INT,CALF,MED, GEN2: Brand: MEDLINE

## (undated) DEVICE — GLOVE SURG SZ 8 L12IN FNGR THK79MIL GRN LTX FREE

## (undated) DEVICE — SUTURE VCRL SZ 4-0 L27IN ABSRB UD L17MM RB-1 1/2 CIR J214H

## (undated) DEVICE — SUTURE MCRYL + SZ 5 0 L18IN ABSRB UD L13MM P 3 3 8 CIR PRIM MCP493G

## (undated) DEVICE — HEAD DONUT FOAM POSITIONER: Brand: CARDINAL HEALTH

## (undated) DEVICE — MINOR GENERAL PACK: Brand: MEDLINE INDUSTRIES, INC.

## (undated) DEVICE — SYRINGE MED 10ML LUERLOCK TIP W/O SFTY DISP

## (undated) DEVICE — GOWN,NON-REINFORCED,XXL: Brand: MEDLINE

## (undated) DEVICE — SOLUTION IRRIG 500ML 0.9% SOD CHLO USP POUR PLAS BTL

## (undated) DEVICE — SYRINGE IRRIG 60ML SFT PLIABLE BLB EZ TO GRP 1 HND USE W/

## (undated) DEVICE — BLADE,CARBON-STEEL,15,STRL,DISPOSABLE,TB: Brand: MEDLINE

## (undated) DEVICE — HYPODERMIC SAFETY NEEDLE: Brand: MONOJECT

## (undated) DEVICE — SOUTHSIDE TURNOVER: Brand: MEDLINE INDUSTRIES, INC.

## (undated) DEVICE — LIQUIBAND RAPID ADHESIVE 36/CS 0.8ML: Brand: MEDLINE